# Patient Record
Sex: MALE | Race: WHITE | Employment: FULL TIME | URBAN - METROPOLITAN AREA
[De-identification: names, ages, dates, MRNs, and addresses within clinical notes are randomized per-mention and may not be internally consistent; named-entity substitution may affect disease eponyms.]

---

## 2020-05-20 ENCOUNTER — TELEPHONE (OUTPATIENT)
Dept: INTERNAL MEDICINE CLINIC | Age: 30
End: 2020-05-20

## 2020-05-21 ENCOUNTER — VIRTUAL VISIT (OUTPATIENT)
Dept: INTERNAL MEDICINE CLINIC | Age: 30
End: 2020-05-21
Payer: COMMERCIAL

## 2020-05-21 VITALS — BODY MASS INDEX: 19.25 KG/M2 | HEIGHT: 74 IN | WEIGHT: 150 LBS

## 2020-05-21 PROBLEM — F98.8 ATTENTION DEFICIT DISORDER (ADD) WITHOUT HYPERACTIVITY: Status: ACTIVE | Noted: 2020-05-21

## 2020-05-21 PROCEDURE — 99203 OFFICE O/P NEW LOW 30 MIN: CPT | Performed by: INTERNAL MEDICINE

## 2020-05-21 RX ORDER — METHYLPHENIDATE HYDROCHLORIDE 18 MG/1
18 TABLET ORAL EVERY MORNING
Qty: 30 TABLET | Refills: 0 | Status: SHIPPED
Start: 2020-05-21 | End: 2020-06-18 | Stop reason: DRUGHIGH

## 2020-05-21 RX ORDER — LORATADINE 10 MG/1
10 TABLET ORAL DAILY
COMMUNITY
End: 2021-11-03

## 2020-05-21 ASSESSMENT — PATIENT HEALTH QUESTIONNAIRE - PHQ9
SUM OF ALL RESPONSES TO PHQ QUESTIONS 1-9: 0
2. FEELING DOWN, DEPRESSED OR HOPELESS: 0
1. LITTLE INTEREST OR PLEASURE IN DOING THINGS: 0
SUM OF ALL RESPONSES TO PHQ QUESTIONS 1-9: 0
SUM OF ALL RESPONSES TO PHQ9 QUESTIONS 1 & 2: 0

## 2020-05-21 NOTE — PROGRESS NOTES
Date of Service:  5/21/2020    Chief Complaint:      Chief Complaint   Patient presents with    Establish Care    ADHD       HPI:  Lisha Gillette is a 26 y.o. Patient is being seen Virtually for Video Audio interactive visit due to [place] [facility]. Pt has consented to the Telehealth virtual visit. ADD:  He complains of difficulty concentrating and staying on task at work and at home with home schooling his 5 y/o ADHD son and another younger one who's also very active. He was on Adderall 10 mg bid-tid 2014 and off once he graduated from [facility]. He did have GI upset with the med due to Adderall having lactose and he's lactose intolerant. No results found for: LABA1C, LABMICR  No results found for: NA, K, CL, CO2, BUN, CREATININE, GLUCOSE, CALCIUM  No results found for: CHOL, TRIG, HDL, LDLCALC, LDLDIRECT  No results found for: ALT, AST  No results found for: TSH, T4FREE  No results found for: WBC, HGB, HCT, MCV, PLT  No results found for: INR   No results found for: PSA   No results found for: LABURIC     There is no problem list on file for this patient. No Known Allergies  Outpatient Medications Marked as Taking for the 5/21/20 encounter (Virtual Visit) with Teresa Turner MD   Medication Sig Dispense Refill    loratadine (CLARITIN) 10 MG tablet Take 10 mg by mouth daily           Review of Systems: 14 systems were negative except of what was stated on HPI    Nursing note and vitals reviewed. Vitals:    05/21/20 0838   Weight: 150 lb (68 kg)   Height: 6' 2\" (1.88 m)     Wt Readings from Last 3 Encounters:   05/21/20 150 lb (68 kg)     BP Readings from Last 3 Encounters:   No data found for BP     Body mass index is 19.26 kg/m². Constitutional: Patient appears well-developed and well-nourished. No distress. Head: Normocephalic and atraumatic. Skin: No rash or erythema. Psychiatric: Normal mood and affect.  Behavior is normal.       Assessment/Plan:  Mireya Sommer was seen today for

## 2020-06-18 ENCOUNTER — OFFICE VISIT (OUTPATIENT)
Dept: INTERNAL MEDICINE CLINIC | Age: 30
End: 2020-06-18
Payer: COMMERCIAL

## 2020-06-18 VITALS
TEMPERATURE: 97.9 F | BODY MASS INDEX: 19.12 KG/M2 | DIASTOLIC BLOOD PRESSURE: 80 MMHG | HEART RATE: 67 BPM | HEIGHT: 74 IN | WEIGHT: 149 LBS | SYSTOLIC BLOOD PRESSURE: 124 MMHG | OXYGEN SATURATION: 99 %

## 2020-06-18 PROBLEM — Z79.899 CONTROLLED SUBSTANCE AGREEMENT SIGNED: Status: ACTIVE | Noted: 2020-06-18

## 2020-06-18 LAB
A/G RATIO: 2 (ref 1.1–2.2)
ALBUMIN SERPL-MCNC: 4.9 G/DL (ref 3.4–5)
ALP BLD-CCNC: 60 U/L (ref 40–129)
ALT SERPL-CCNC: 17 U/L (ref 10–40)
ANION GAP SERPL CALCULATED.3IONS-SCNC: 13 MMOL/L (ref 3–16)
AST SERPL-CCNC: 28 U/L (ref 15–37)
BASOPHILS ABSOLUTE: 0 K/UL (ref 0–0.2)
BASOPHILS RELATIVE PERCENT: 0.5 %
BILIRUB SERPL-MCNC: 0.5 MG/DL (ref 0–1)
BUN BLDV-MCNC: 12 MG/DL (ref 7–20)
CALCIUM SERPL-MCNC: 9.5 MG/DL (ref 8.3–10.6)
CHLORIDE BLD-SCNC: 99 MMOL/L (ref 99–110)
CHOLESTEROL, TOTAL: 142 MG/DL (ref 0–199)
CO2: 27 MMOL/L (ref 21–32)
CREAT SERPL-MCNC: 0.9 MG/DL (ref 0.9–1.3)
EOSINOPHILS ABSOLUTE: 0.1 K/UL (ref 0–0.6)
EOSINOPHILS RELATIVE PERCENT: 0.9 %
GFR AFRICAN AMERICAN: >60
GFR NON-AFRICAN AMERICAN: >60
GLOBULIN: 2.4 G/DL
GLUCOSE BLD-MCNC: 92 MG/DL (ref 70–99)
HCT VFR BLD CALC: 43.7 % (ref 40.5–52.5)
HDLC SERPL-MCNC: 60 MG/DL (ref 40–60)
HEMOGLOBIN: 14.4 G/DL (ref 13.5–17.5)
LDL CHOLESTEROL CALCULATED: 69 MG/DL
LYMPHOCYTES ABSOLUTE: 1.7 K/UL (ref 1–5.1)
LYMPHOCYTES RELATIVE PERCENT: 28.7 %
MCH RBC QN AUTO: 31.1 PG (ref 26–34)
MCHC RBC AUTO-ENTMCNC: 33 G/DL (ref 31–36)
MCV RBC AUTO: 94.3 FL (ref 80–100)
MONOCYTES ABSOLUTE: 0.5 K/UL (ref 0–1.3)
MONOCYTES RELATIVE PERCENT: 7.7 %
NEUTROPHILS ABSOLUTE: 3.8 K/UL (ref 1.7–7.7)
NEUTROPHILS RELATIVE PERCENT: 62.2 %
PDW BLD-RTO: 13.5 % (ref 12.4–15.4)
PLATELET # BLD: 256 K/UL (ref 135–450)
PMV BLD AUTO: 8 FL (ref 5–10.5)
POTASSIUM SERPL-SCNC: 4.8 MMOL/L (ref 3.5–5.1)
RBC # BLD: 4.63 M/UL (ref 4.2–5.9)
SODIUM BLD-SCNC: 139 MMOL/L (ref 136–145)
TOTAL PROTEIN: 7.3 G/DL (ref 6.4–8.2)
TRIGL SERPL-MCNC: 66 MG/DL (ref 0–150)
VLDLC SERPL CALC-MCNC: 13 MG/DL
WBC # BLD: 6.1 K/UL (ref 4–11)

## 2020-06-18 PROCEDURE — 36415 COLL VENOUS BLD VENIPUNCTURE: CPT | Performed by: INTERNAL MEDICINE

## 2020-06-18 PROCEDURE — 99395 PREV VISIT EST AGE 18-39: CPT | Performed by: INTERNAL MEDICINE

## 2020-06-18 RX ORDER — METHYLPHENIDATE HYDROCHLORIDE 27 MG/1
27 TABLET ORAL EVERY MORNING
Qty: 30 TABLET | Refills: 0 | Status: SHIPPED
Start: 2020-06-20 | End: 2020-07-16 | Stop reason: DRUGHIGH

## 2020-06-18 NOTE — PROGRESS NOTES
Social History     Socioeconomic History    Marital status:      Spouse name: Not on file    Number of children: Not on file    Years of education: Not on file    Highest education level: Not on file   Occupational History    Not on file   Social Needs    Financial resource strain: Not on file    Food insecurity     Worry: Not on file     Inability: Not on file    Transportation needs     Medical: Not on file     Non-medical: Not on file   Tobacco Use    Smoking status: Never Smoker    Smokeless tobacco: Never Used   Substance and Sexual Activity    Alcohol use: Yes    Drug use: Never    Sexual activity: Yes   Lifestyle    Physical activity     Days per week: Not on file     Minutes per session: Not on file    Stress: Not on file   Relationships    Social connections     Talks on phone: Not on file     Gets together: Not on file     Attends Hindu service: Not on file     Active member of club or organization: Not on file     Attends meetings of clubs or organizations: Not on file     Relationship status: Not on file    Intimate partner violence     Fear of current or ex partner: Not on file     Emotionally abused: Not on file     Physically abused: Not on file     Forced sexual activity: Not on file   Other Topics Concern    Not on file   Social History Narrative    Not on file       Review of Systems:  A comprehensive review of systems was negative except for what was noted in the HPI. Physical Exam:   Vitals:    06/18/20 1121   BP: 124/80   Pulse: 67   Temp: 97.9 °F (36.6 °C)   TempSrc: Infrared   SpO2: 99%   Weight: 149 lb (67.6 kg)   Height: 6' 2\" (1.88 m)     Body mass index is 19.13 kg/m². Constitutional: He is oriented to person, place, and time. He appears well-developed and well-nourished. No distress. HEENT:   Head: Normocephalic and atraumatic.    Right Ear: Tympanic membrane, external ear and ear canal normal.   Left Ear: Tympanic membrane, external ear and ear recommended and ordered   Cholesterol Screen  No results found for: CHOL, TRIG, HDL, LDLCALC, LDLDIRECT Test recommended and ordered       Weight: Body mass index is 19.13 kg/m². 6' 2\" (1.88 m)149 lb (67.6 kg)  Your BMI is between 18.5 and 24.9, which indicates that you are at a healthy weight        Recommended Immunizations      There is no immunization history on file for this patient. Influenza vaccine:  recommended every fall         Other Recommendations ·   See a dentist every 6 months  · Try to get at least 30 minutes of exercise 3-5 days per week  · Always wear a seat belt when traveling in a car  · Always wear a helmet when riding a bicycle or motorcycle  · When exposed to the sun, use a sunscreen that protects against both UVA and UVB radiation with an SPF of 30 or greater- reapply every 2 to 3 hours or after sweating, drying off with a towel, or swimming             Assessment/Plan:  Naga Flynn was seen today for annual exam and adhd. Diagnoses and all orders for this visit:    Annual physical exam  -     Lipid Panel  -     Comprehensive Metabolic Panel  -     CBC Auto Differential    Attention deficit disorder (ADD) without hyperactivity  -     methylphenidate (CONCERTA) 27 MG extended release tablet; Take 1 tablet by mouth every morning for 30 days. Controlled substance agreement signed  -     methylphenidate (CONCERTA) 27 MG extended release tablet; Take 1 tablet by mouth every morning for 30 days. Controlled Substance Monitoring:    Acute and Chronic Pain Monitoring:   RX Monitoring 6/18/2020   Periodic Controlled Substance Monitoring Possible medication side effects, risk of tolerance/dependence & alternative treatments discussed. ;No signs of potential drug abuse or diversion identified. Return VV 1 month on ADD.

## 2020-06-18 NOTE — LETTER
MEDICATION AGREEMENT     Dash Mora  [birth year]      For certain conditions, multiple classes of medications may be used to help better manage your symptoms, and to improve your ability to function at home, work and in social settings. However, these medications do have risks, which will be discussed with you, including addiction and dependency. The following prescribed medications need frequent monitoring and will require you to partner and assist in your healthcare. Medication  Dose, instructions and quantity as indicated on current prescription bottle Diagnosis/Reason(s) for Taking Category     Concerta qd ADD                            Benefits and goals of Controlled Substance Medications: There are two potential goals for your treatment: (1) decreased pain and suffering (2) improved daily life functions. There are many possible treatments for your chronic condition(s), and, in addition to controlled substance medications, we will try alternatives such as physical therapy, yoga, massage, home daily exercise, meditation, relaxation techniques, injections, chiropractic manipulations, surgery, cognitive therapy, hypnosis and many medications that are not habit-forming. Use of controlled substance medications may be helpful, but they are unlikely to resolve all of your symptoms or restore all function. Risks of Controlled Substance Medications:    Opioid pain medications: These medications can lead to problems such as addiction/dependence, sedation, lightheadedness/dizziness, memory issues, falls, constipation, nausea, or vomiting. They may also impair the ability to drive or operate machinery. Additionally, these medications may lower testosterone levels, leading to loss of bone strength, stamina and sex drive.   They may cause problems with breathing, sleep apnea and reduced coughing, which are especially dangerous for patients with lung

## 2020-07-16 ENCOUNTER — VIRTUAL VISIT (OUTPATIENT)
Dept: INTERNAL MEDICINE CLINIC | Age: 30
End: 2020-07-16
Payer: COMMERCIAL

## 2020-07-16 PROCEDURE — 99213 OFFICE O/P EST LOW 20 MIN: CPT | Performed by: INTERNAL MEDICINE

## 2020-07-16 RX ORDER — METHYLPHENIDATE HYDROCHLORIDE 10 MG/1
10 TABLET ORAL 3 TIMES DAILY
Qty: 30 TABLET | Refills: 0 | Status: SHIPPED | OUTPATIENT
Start: 2020-07-16 | End: 2020-07-23 | Stop reason: SDUPTHER

## 2020-07-16 NOTE — PROGRESS NOTES
Date of Service:  7/16/2020    Chief Complaint:      Chief Complaint   Patient presents with    ADHD       HPI:  Anton Cortes is a 31 y.o. Pursuant to the emergency declaration under the [facility], [facility] waiver authority and the [employer] and [employer] Act, this Virtual  Video Visit was conducted, with patient's consent, to reduce the patient's risk of exposure to COVID-19 and provide continuity of care. Service is  provided through a video synchronous discussion virtually to substitute for in-person clinic visit with the patient being at home and Dr. Leticia Frye being at home. ADD:  improvement in concentrating and staying on task at work and at home with home schooling his 7 y/o ADHD son and another younger one who's also very active on Concerta 27 mg qd. He wanted to change to methylphenidate 10 mg tid since it only cost $13.  He used to be on Adderall 10 mg bid-tid 2014 and off once he graduated from [address] [place] [address] Way did have GI upset with the med due to Adderall having lactose and he's lactose intolerant.     No results found for: Frances Shed  Lab Results   Component Value Date     06/18/2020    K 4.8 06/18/2020    CL 99 06/18/2020    CO2 27 06/18/2020    BUN 12 06/18/2020    CREATININE 0.9 06/18/2020    GLUCOSE 92 06/18/2020    CALCIUM 9.5 06/18/2020     Lab Results   Component Value Date    CHOL 142 06/18/2020    TRIG 66 06/18/2020    HDL 60 06/18/2020    LDLCALC 69 06/18/2020     Lab Results   Component Value Date    ALT 17 06/18/2020    AST 28 06/18/2020     No results found for: TSH, T4FREE  Lab Results   Component Value Date    WBC 6.1 06/18/2020    HGB 14.4 06/18/2020    HCT 43.7 06/18/2020    MCV 94.3 06/18/2020     06/18/2020     No results found for: INR   No results found for: PSA   No results found for: [facility]     Patient Active Problem List   Diagnosis    Attention deficit disorder (ADD) without hyperactivity    Controlled substance agreement signed       No Known Allergies  Outpatient Medications Marked as Taking for the 7/16/20 encounter (Virtual Visit) with Morelia Turner MD   Medication Sig Dispense Refill    methylphenidate (CONCERTA) 27 MG extended release tablet Take 1 tablet by mouth every morning for 30 days. 30 tablet 0    loratadine (CLARITIN) 10 MG tablet Take 10 mg by mouth daily           Review of Systems: 14 systems were negative except of what was stated on HPI    Nursing note and vitals reviewed. There were no vitals filed for this visit. Wt Readings from Last 3 Encounters:   06/18/20 149 lb (67.6 kg)   05/21/20 150 lb (68 kg)     BP Readings from Last 3 Encounters:   06/18/20 124/80     There is no height or weight on file to calculate BMI. Constitutional: Patient appears well-developed and well-nourished. No distress. Head: Normocephalic and atraumatic. Skin: No rash or erythema. Psychiatric: Normal mood and affect. Behavior is normal.       Assessment/Plan:  Venus Diaz was seen today for adhd. Diagnoses and all orders for this visit:    Attention deficit disorder (ADD) without hyperactivity  Change to methylphenidate (RITALIN) 10 MG tablet; Take 1 tablet by mouth 3 times daily for 90 days. Controlled substance agreement signed  Change to methylphenidate (RITALIN) 10 MG tablet; Take 1 tablet by mouth 3 times daily for 90 days. If works well, send a message and will send 2 more months    Return Oct 22 at 1:20 for ADD.

## 2020-07-23 RX ORDER — METHYLPHENIDATE HYDROCHLORIDE 10 MG/1
10 TABLET ORAL 3 TIMES DAILY PRN
Qty: 90 TABLET | Refills: 0 | Status: SHIPPED | OUTPATIENT
Start: 2020-08-26 | End: 2020-10-22 | Stop reason: SDUPTHER

## 2020-07-23 RX ORDER — METHYLPHENIDATE HYDROCHLORIDE 10 MG/1
10 TABLET ORAL 3 TIMES DAILY PRN
Qty: 90 TABLET | Refills: 0 | Status: SHIPPED | OUTPATIENT
Start: 2020-09-26 | End: 2020-10-22 | Stop reason: SDUPTHER

## 2020-07-23 RX ORDER — METHYLPHENIDATE HYDROCHLORIDE 10 MG/1
10 TABLET ORAL 3 TIMES DAILY PRN
Qty: 90 TABLET | Refills: 0 | Status: SHIPPED | OUTPATIENT
Start: 2020-07-26 | End: 2020-10-22 | Stop reason: SDUPTHER

## 2020-10-22 ENCOUNTER — TELEMEDICINE (OUTPATIENT)
Dept: INTERNAL MEDICINE CLINIC | Age: 30
End: 2020-10-22
Payer: COMMERCIAL

## 2020-10-22 PROCEDURE — 99213 OFFICE O/P EST LOW 20 MIN: CPT | Performed by: INTERNAL MEDICINE

## 2020-10-22 RX ORDER — METHYLPHENIDATE HYDROCHLORIDE 10 MG/1
10 TABLET ORAL 3 TIMES DAILY PRN
Qty: 90 TABLET | Refills: 0 | Status: SHIPPED | OUTPATIENT
Start: 2020-12-28 | End: 2021-01-26 | Stop reason: SDUPTHER

## 2020-10-22 RX ORDER — METHYLPHENIDATE HYDROCHLORIDE 10 MG/1
10 TABLET ORAL 3 TIMES DAILY
Qty: 90 TABLET | Refills: 0 | Status: SHIPPED | OUTPATIENT
Start: 2020-10-22 | End: 2021-01-26 | Stop reason: SDUPTHER

## 2020-10-22 RX ORDER — METHYLPHENIDATE HYDROCHLORIDE 10 MG/1
10 TABLET ORAL 3 TIMES DAILY
Qty: 90 TABLET | Refills: 0 | Status: SHIPPED | OUTPATIENT
Start: 2020-11-28 | End: 2021-01-26 | Stop reason: SDUPTHER

## 2020-10-22 NOTE — PROGRESS NOTES
Date of Service:  10/22/2020    Chief Complaint:      Chief Complaint   Patient presents with    ADHD       HPI:  Sheri Olsen is a 31 y.o. Pursuant to the emergency declaration under the [facility], [facility] waiver authority and the [employer] and [employer] Act, this Virtual  Video Visit was conducted, with patient's consent, to reduce the patient's risk of exposure to COVID-19 and provide continuity of care. Service is  provided through a video synchronous discussion virtually to substitute for in-person clinic visit with the patient being at home and Dr. Ha Adorno being at home.     ADD:  improvement in concentrating and staying on task at work and at home with home schooling his 5 y/o ADHD son and another younger one who's also very active on Ritalin 10 mg 2 AM and 1 afternoon.  He used to be on Adderall 10 mg bid-tid 2014 and off once he graduated from [address] did have GI upset with the med due to Adderall having lactose and he's lactose intolerant    No results found for: LABA1C, LABMICR  Lab Results   Component Value Date     06/18/2020    K 4.8 06/18/2020    CL 99 06/18/2020    CO2 27 06/18/2020    BUN 12 06/18/2020    CREATININE 0.9 06/18/2020    GLUCOSE 92 06/18/2020    CALCIUM 9.5 06/18/2020     Lab Results   Component Value Date    CHOL 142 06/18/2020    TRIG 66 06/18/2020    HDL 60 06/18/2020    LDLCALC 69 06/18/2020     Lab Results   Component Value Date    ALT 17 06/18/2020    AST 28 06/18/2020     No results found for: TSH, T4FREE  Lab Results   Component Value Date    WBC 6.1 06/18/2020    HGB 14.4 06/18/2020    HCT 43.7 06/18/2020    MCV 94.3 06/18/2020     06/18/2020     No results found for: INR   No results found for: PSA   No results found for: [facility]     Patient Active Problem List   Diagnosis    Attention deficit disorder (ADD) without hyperactivity    Controlled substance agreement signed No Known Allergies  Outpatient Medications Marked as Taking for the 10/22/20 encounter (Telemedicine) with Tahir Turner MD   Medication Sig Dispense Refill    methylphenidate (RITALIN) 10 MG tablet Take 1 tablet by mouth 3 times daily as needed (add) for up to 30 days. 90 tablet 0    loratadine (CLARITIN) 10 MG tablet Take 10 mg by mouth daily           Review of Systems: 14 systems were negative except of what was stated on HPI    Nursing note and vitals reviewed. There were no vitals filed for this visit. Wt Readings from Last 3 Encounters:   06/18/20 149 lb (67.6 kg)   05/21/20 150 lb (68 kg)     BP Readings from Last 3 Encounters:   06/18/20 124/80     There is no height or weight on file to calculate BMI. Constitutional: Patient appears well-developed and well-nourished. No distress. Head: Normocephalic and atraumatic. Skin: No rash or erythema. Psychiatric: Normal mood and affect. Behavior is normal.       Assessment/Plan:  Renee Landis was seen today for adhd. Diagnoses and all orders for this visit:    Attention deficit disorder (ADD) without hyperactivity  -     methylphenidate (RITALIN) 10 MG tablet; Take 1 tablet by mouth 3 times daily as needed (add) for up to 30 days. -     methylphenidate (RITALIN) 10 MG tablet; Take 1 tablet by mouth 3 times daily for 30 days. -     methylphenidate (RITALIN) 10 MG tablet; Take 1 tablet by mouth 3 times daily for 30 days. 2 po AM and 1 PM    Controlled substance agreement signed  -     methylphenidate (RITALIN) 10 MG tablet; Take 1 tablet by mouth 3 times daily as needed (add) for up to 30 days. -     methylphenidate (RITALIN) 10 MG tablet; Take 1 tablet by mouth 3 times daily for 30 days. -     methylphenidate (RITALIN) 10 MG tablet; Take 1 tablet by mouth 3 times daily for 30 days.  2 po AM and 1 PM      Controlled Substance Monitoring:    Acute and Chronic Pain Monitoring:   RX Monitoring 10/22/2020   Periodic Controlled Substance Monitoring Possible medication side effects, risk of tolerance/dependence & alternative treatments discussed. ;No signs of potential drug abuse or diversion identified. Return Jan 26 VV 1 ADD.

## 2021-01-26 ENCOUNTER — VIRTUAL VISIT (OUTPATIENT)
Dept: INTERNAL MEDICINE CLINIC | Age: 31
End: 2021-01-26
Payer: COMMERCIAL

## 2021-01-26 DIAGNOSIS — Z79.899 CONTROLLED SUBSTANCE AGREEMENT SIGNED: ICD-10-CM

## 2021-01-26 DIAGNOSIS — F98.8 ATTENTION DEFICIT DISORDER (ADD) WITHOUT HYPERACTIVITY: ICD-10-CM

## 2021-01-26 PROCEDURE — 99213 OFFICE O/P EST LOW 20 MIN: CPT | Performed by: INTERNAL MEDICINE

## 2021-01-26 RX ORDER — METHYLPHENIDATE HYDROCHLORIDE 10 MG/1
10 TABLET ORAL 3 TIMES DAILY PRN
Qty: 90 TABLET | Refills: 0 | Status: SHIPPED | OUTPATIENT
Start: 2021-03-28 | End: 2021-04-27 | Stop reason: SDUPTHER

## 2021-01-26 RX ORDER — METHYLPHENIDATE HYDROCHLORIDE 10 MG/1
20 TABLET ORAL 2 TIMES DAILY
Qty: 120 TABLET | Refills: 0 | Status: SHIPPED | OUTPATIENT
Start: 2021-01-28 | End: 2021-04-27 | Stop reason: SDUPTHER

## 2021-01-26 RX ORDER — METHYLPHENIDATE HYDROCHLORIDE 10 MG/1
20 TABLET ORAL 2 TIMES DAILY
Qty: 120 TABLET | Refills: 0 | Status: SHIPPED | OUTPATIENT
Start: 2021-01-26 | End: 2021-04-27 | Stop reason: SDUPTHER

## 2021-01-26 NOTE — PROGRESS NOTES
Date of Service:  1/26/2021    Chief Complaint:      Chief Complaint   Patient presents with    ADHD       HPI:  Michael Perez is a 31 y.o. Pursuant to the emergency declaration under the [facility], [facility] waiver authority and the [employer] and [employer] Act, this Virtual  Video Visit was conducted, with patient's consent, to reduce the patient's risk of exposure to COVID-19 and provide continuity of care. Service is  provided through a video synchronous discussion virtually to substitute for in-person clinic visit with the patient being at home and Dr. Alfredito Puga being at home.     ADD:  improvement in concentrating and staying on task at work and at home with home schooling his 7 y/o ADHD son and another younger one who's also very active on Ritalin 10 mg 2 at 8 AM and not work as well around 11ish and 1 afternoon.  He used to be on Adderall 10 mg bid-tid 2014 and off once he graduated from [address] did have GI upset with the med due to Adderall having lactose and he's lactose intolerant    No results found for: LABA1C, LABMICR  Lab Results   Component Value Date     06/18/2020    K 4.8 06/18/2020    CL 99 06/18/2020    CO2 27 06/18/2020    BUN 12 06/18/2020    CREATININE 0.9 06/18/2020    GLUCOSE 92 06/18/2020    CALCIUM 9.5 06/18/2020     Lab Results   Component Value Date    CHOL 142 06/18/2020    TRIG 66 06/18/2020    HDL 60 06/18/2020    LDLCALC 69 06/18/2020     Lab Results   Component Value Date    ALT 17 06/18/2020    AST 28 06/18/2020     No results found for: TSH, T4FREE  Lab Results   Component Value Date    WBC 6.1 06/18/2020    HGB 14.4 06/18/2020    HCT 43.7 06/18/2020    MCV 94.3 06/18/2020     06/18/2020     No results found for: INR   No results found for: PSA   No results found for: [facility]     Patient Active Problem List   Diagnosis    Attention deficit disorder (ADD) without hyperactivity  Controlled substance agreement signed       No Known Allergies  Outpatient Medications Marked as Taking for the 1/26/21 encounter (Virtual Visit) with Francie Prior Yue MD   Medication Sig Dispense Refill    methylphenidate (RITALIN) 10 MG tablet Take 1 tablet by mouth 3 times daily as needed (add) for up to 30 days. 90 tablet 0    loratadine (CLARITIN) 10 MG tablet Take 10 mg by mouth daily           Review of Systems: 14 systems were negative except of what was stated on HPI    Nursing note and vitals reviewed. There were no vitals filed for this visit. Wt Readings from Last 3 Encounters:   06/18/20 149 lb (67.6 kg)   05/21/20 150 lb (68 kg)     BP Readings from Last 3 Encounters:   06/18/20 124/80     There is no height or weight on file to calculate BMI. Constitutional: Patient appears well-developed and well-nourished. No distress. Head: Normocephalic and atraumatic. Skin: No rash or erythema. Psychiatric: Normal mood and affect. Behavior is normal.       Assessment/Plan:  Josh Cottrell was seen today for adhd. Diagnoses and all orders for this visit:    Attention deficit disorder (ADD) without hyperactivity  -     methylphenidate (RITALIN) 10 MG tablet; Take 1 tablet by mouth 3 times daily as needed (add) for up to 30 days. -     methylphenidate (RITALIN) 10 MG tablet; Take 2 tablets by mouth 2 times daily for 30 days. -     methylphenidate (RITALIN) 10 MG tablet; Take 2 tablets by mouth 2 times daily for 30 days. 2 po AM and 1 PM    Controlled substance agreement signed  -     methylphenidate (RITALIN) 10 MG tablet; Take 1 tablet by mouth 3 times daily as needed (add) for up to 30 days. -     methylphenidate (RITALIN) 10 MG tablet; Take 2 tablets by mouth 2 times daily for 30 days. -     methylphenidate (RITALIN) 10 MG tablet; Take 2 tablets by mouth 2 times daily for 30 days.  2 po AM and 1 PM    Increase dose to above    Controlled Substance Monitoring:    Acute and Chronic Pain Monitoring: RX Monitoring 1/26/2021   Periodic Controlled Substance Monitoring Possible medication side effects, risk of tolerance/dependence & alternative treatments discussed. ;No signs of potential drug abuse or diversion identified. Return April 27 at 1 VV ADD f/u.

## 2021-04-27 ENCOUNTER — VIRTUAL VISIT (OUTPATIENT)
Dept: INTERNAL MEDICINE CLINIC | Age: 31
End: 2021-04-27
Payer: COMMERCIAL

## 2021-04-27 DIAGNOSIS — F98.8 ATTENTION DEFICIT DISORDER (ADD) WITHOUT HYPERACTIVITY: ICD-10-CM

## 2021-04-27 DIAGNOSIS — Z79.899 CONTROLLED SUBSTANCE AGREEMENT SIGNED: ICD-10-CM

## 2021-04-27 PROCEDURE — 99213 OFFICE O/P EST LOW 20 MIN: CPT | Performed by: INTERNAL MEDICINE

## 2021-04-27 RX ORDER — METHYLPHENIDATE HYDROCHLORIDE 10 MG/1
20 TABLET ORAL 2 TIMES DAILY
Qty: 120 TABLET | Refills: 0 | Status: SHIPPED | OUTPATIENT
Start: 2021-04-27 | End: 2021-07-28 | Stop reason: SDUPTHER

## 2021-04-27 RX ORDER — METHYLPHENIDATE HYDROCHLORIDE 10 MG/1
20 TABLET ORAL 2 TIMES DAILY
Qty: 120 TABLET | Refills: 0 | Status: SHIPPED | OUTPATIENT
Start: 2021-05-28 | End: 2021-07-28 | Stop reason: SDUPTHER

## 2021-04-27 NOTE — PROGRESS NOTES
Date of Service:  4/27/2021    Chief Complaint:      Chief Complaint   Patient presents with    ADHD       HPI:  Toro Rascon is a 31 y.o. Pursuant to the emergency declaration under the [facility] waiver authority and the [employer] and [employer] Act, this Virtual  Video Visit was conducted, with patient's consent, to reduce the patient's risk of exposure to COVID-19 and provide continuity of care. Service is  provided through a video synchronous discussion virtually to substitute for in-person clinic visit with the patient being at home and Dr. Turner Wilson being at home. Patient consent to the video visit.     ADD:  improvement in concentrating and staying on task at work and at home with home schooling his 5 y/o ADHD son and another younger one who's also very active on Ritalin 10 mg 2 AM, 2 PM (1 at atime spaced out in the) 6 days a week.  He used to be on Adderall 10 mg bid-tid 2014 and off once he graduated from [address] [place] [address] Way did have GI upset with the med due to Adderall having lactose and he's lactose intolerant    No results found for: LABA1C, LABMICR  Lab Results   Component Value Date     06/18/2020    K 4.8 06/18/2020    CL 99 06/18/2020    CO2 27 06/18/2020    BUN 12 06/18/2020    CREATININE 0.9 06/18/2020    GLUCOSE 92 06/18/2020    CALCIUM 9.5 06/18/2020     Lab Results   Component Value Date    CHOL 142 06/18/2020    TRIG 66 06/18/2020    HDL 60 06/18/2020    LDLCALC 69 06/18/2020     Lab Results   Component Value Date    ALT 17 06/18/2020    AST 28 06/18/2020     No results found for: TSH, T4FREE  Lab Results   Component Value Date    WBC 6.1 06/18/2020    HGB 14.4 06/18/2020    HCT 43.7 06/18/2020    MCV 94.3 06/18/2020     06/18/2020     No results found for: INR   No results found for: PSA   No results found for: [facility]     Patient Active Problem List   Diagnosis    Attention deficit disorder (ADD) without hyperactivity    Controlled substance agreement signed       No Known Allergies  Outpatient Medications Marked as Taking for the 4/27/21 encounter (Virtual Visit) with Meaghan Turner MD   Medication Sig Dispense Refill    methylphenidate (RITALIN) 10 MG tablet Take 1 tablet by mouth 3 times daily as needed (add) for up to 30 days. 90 tablet 0    loratadine (CLARITIN) 10 MG tablet Take 10 mg by mouth daily           Review of Systems: 14 systems were negative except of what was stated on HPI    Nursing note and vitals reviewed. There were no vitals filed for this visit. Wt Readings from Last 3 Encounters:   06/18/20 149 lb (67.6 kg)   05/21/20 150 lb (68 kg)     BP Readings from Last 3 Encounters:   06/18/20 124/80     There is no height or weight on file to calculate BMI. Constitutional: Patient appears well-developed and well-nourished. No distress. Head: Normocephalic and atraumatic. Skin: No rash or erythema. Psychiatric: Normal mood and affect. Behavior is normal.       Assessment/Plan:  Beverley Pace was seen today for adhd. Diagnoses and all orders for this visit:    Attention deficit disorder (ADD) without hyperactivity  -     methylphenidate (RITALIN) 10 MG tablet; Take 2 tablets by mouth 2 times daily for 30 days. -     methylphenidate (RITALIN) 10 MG tablet; Take 2 tablets by mouth 2 times daily for 30 days. -     methylphenidate (RITALIN) 10 MG tablet; Take 2 tablets by mouth 2 times daily for 30 days. Controlled substance agreement signed  -     methylphenidate (RITALIN) 10 MG tablet; Take 2 tablets by mouth 2 times daily for 30 days. -     methylphenidate (RITALIN) 10 MG tablet; Take 2 tablets by mouth 2 times daily for 30 days. -     methylphenidate (RITALIN) 10 MG tablet; Take 2 tablets by mouth 2 times daily for 30 days.       Controlled Substance Monitoring:    Acute and Chronic Pain Monitoring:   RX Monitoring 1/26/2021   Periodic Controlled Substance Monitoring Possible medication side effects, risk of tolerance/dependence & alternative treatments discussed. ;No signs of potential drug abuse or diversion identified.            Return July 28 at 8:40 Fasting Physical.

## 2021-07-28 ENCOUNTER — OFFICE VISIT (OUTPATIENT)
Dept: INTERNAL MEDICINE CLINIC | Age: 31
End: 2021-07-28
Payer: COMMERCIAL

## 2021-07-28 VITALS
DIASTOLIC BLOOD PRESSURE: 68 MMHG | HEART RATE: 65 BPM | OXYGEN SATURATION: 98 % | HEIGHT: 74 IN | SYSTOLIC BLOOD PRESSURE: 118 MMHG | WEIGHT: 153 LBS | BODY MASS INDEX: 19.64 KG/M2

## 2021-07-28 DIAGNOSIS — Z79.899 CONTROLLED SUBSTANCE AGREEMENT SIGNED: ICD-10-CM

## 2021-07-28 DIAGNOSIS — F98.8 ATTENTION DEFICIT DISORDER (ADD) WITHOUT HYPERACTIVITY: ICD-10-CM

## 2021-07-28 DIAGNOSIS — Z00.00 ANNUAL PHYSICAL EXAM: Primary | ICD-10-CM

## 2021-07-28 PROCEDURE — 99395 PREV VISIT EST AGE 18-39: CPT | Performed by: INTERNAL MEDICINE

## 2021-07-28 RX ORDER — METHYLPHENIDATE HYDROCHLORIDE 10 MG/1
20 TABLET ORAL 2 TIMES DAILY
Qty: 120 TABLET | Refills: 0 | Status: SHIPPED | OUTPATIENT
Start: 2021-09-28 | End: 2021-11-03 | Stop reason: SDUPTHER

## 2021-07-28 RX ORDER — METHYLPHENIDATE HYDROCHLORIDE 10 MG/1
20 TABLET ORAL 2 TIMES DAILY
Qty: 120 TABLET | Refills: 0 | Status: SHIPPED | OUTPATIENT
Start: 2021-07-28 | End: 2021-11-03 | Stop reason: SDUPTHER

## 2021-07-28 RX ORDER — CETIRIZINE HYDROCHLORIDE 10 MG/1
10 TABLET ORAL DAILY
COMMUNITY

## 2021-07-28 RX ORDER — METHYLPHENIDATE HYDROCHLORIDE 10 MG/1
20 TABLET ORAL 2 TIMES DAILY
Qty: 120 TABLET | Refills: 0 | Status: SHIPPED | OUTPATIENT
Start: 2021-08-28 | End: 2021-11-03 | Stop reason: SDUPTHER

## 2021-07-28 SDOH — ECONOMIC STABILITY: FOOD INSECURITY: WITHIN THE PAST 12 MONTHS, THE FOOD YOU BOUGHT JUST DIDN'T LAST AND YOU DIDN'T HAVE MONEY TO GET MORE.: NEVER TRUE

## 2021-07-28 SDOH — ECONOMIC STABILITY: FOOD INSECURITY: WITHIN THE PAST 12 MONTHS, YOU WORRIED THAT YOUR FOOD WOULD RUN OUT BEFORE YOU GOT MONEY TO BUY MORE.: NEVER TRUE

## 2021-07-28 ASSESSMENT — PATIENT HEALTH QUESTIONNAIRE - PHQ9
SUM OF ALL RESPONSES TO PHQ9 QUESTIONS 1 & 2: 0
SUM OF ALL RESPONSES TO PHQ QUESTIONS 1-9: 0
1. LITTLE INTEREST OR PLEASURE IN DOING THINGS: 0
SUM OF ALL RESPONSES TO PHQ QUESTIONS 1-9: 0
2. FEELING DOWN, DEPRESSED OR HOPELESS: 0
SUM OF ALL RESPONSES TO PHQ QUESTIONS 1-9: 0

## 2021-07-28 ASSESSMENT — SOCIAL DETERMINANTS OF HEALTH (SDOH): HOW HARD IS IT FOR YOU TO PAY FOR THE VERY BASICS LIKE FOOD, HOUSING, MEDICAL CARE, AND HEATING?: NOT HARD AT ALL

## 2021-07-28 NOTE — PROGRESS NOTES
[facility]  History and Physical  Paty Dang M.D. Robin Mei  YOB: 1990    Date of Service:  7/28/2021    Chief Complaint:   Robin Mei is a 27 y.o. male who presents for   Chief Complaint   Patient presents with    Annual Exam       Assessment/Plan:    Kodak Rodríguez was seen today for annual exam.    Diagnoses and all orders for this visit:    Annual physical exam  -     CBC Auto Differential; Future  -     Lipid Panel; Future  -     Comprehensive Metabolic Panel; Future    Attention deficit disorder (ADD) without hyperactivity  -     methylphenidate (RITALIN) 10 MG tablet; Take 2 tablets by mouth 2 times daily for 30 days. -     methylphenidate (RITALIN) 10 MG tablet; Take 2 tablets by mouth 2 times daily for 30 days. -     methylphenidate (RITALIN) 10 MG tablet; Take 2 tablets by mouth 2 times daily for 30 days. Controlled substance agreement signed  -     methylphenidate (RITALIN) 10 MG tablet; Take 2 tablets by mouth 2 times daily for 30 days. -     methylphenidate (RITALIN) 10 MG tablet; Take 2 tablets by mouth 2 times daily for 30 days. -     methylphenidate (RITALIN) 10 MG tablet; Take 2 tablets by mouth 2 times daily for 30 days. Controlled Substance Monitoring:    Acute and Chronic Pain Monitoring:   RX Monitoring 7/28/2021   Periodic Controlled Substance Monitoring Possible medication side effects, risk of tolerance/dependence & alternative treatments discussed. ;No signs of potential drug abuse or diversion identified. Return 3 months ADD. HPI: Here for Annual Physical and Follow up.     ADD:  improvement in concentrating and staying on task at work and at home with home schooling his 5 y/o ADHD son and another younger one who's also very active on Ritalin 10 mg 2 AM, 2 PM (1 at a time spaced out in the) 6 days a week.  He used to be on Adderall 10 mg bid-tid 2014 and off once he graduated from [address] [place] [address] Way did have GI upset with the med due to Adderall having lactose and he's lactose intolerant    No results found for: LABA1C, LABMICR  Lab Results   Component Value Date     06/18/2020    K 4.8 06/18/2020    CL 99 06/18/2020    CO2 27 06/18/2020    BUN 12 06/18/2020    CREATININE 0.9 06/18/2020    GLUCOSE 92 06/18/2020    CALCIUM 9.5 06/18/2020     Lab Results   Component Value Date    CHOL 142 06/18/2020    TRIG 66 06/18/2020    HDL 60 06/18/2020    LDLCALC 69 06/18/2020     Lab Results   Component Value Date    ALT 17 06/18/2020    AST 28 06/18/2020     No results found for: TSH, T4FREE  Lab Results   Component Value Date    WBC 6.1 06/18/2020    HGB 14.4 06/18/2020    HCT 43.7 06/18/2020    MCV 94.3 06/18/2020     06/18/2020     No results found for: INR   No results found for: PSA   No results found for: LABURIC     Wt Readings from Last 3 Encounters:   07/28/21 153 lb (69.4 kg)   06/18/20 149 lb (67.6 kg)   05/21/20 150 lb (68 kg)     BP Readings from Last 3 Encounters:   07/28/21 118/68   06/18/20 124/80       Patient Active Problem List   Diagnosis    Attention deficit disorder (ADD) without hyperactivity    Controlled substance agreement signed       No Known Allergies  Outpatient Medications Marked as Taking for the 7/28/21 encounter (Office Visit) with Taya Turner MD   Medication Sig Dispense Refill    cetirizine (ZYRTEC) 10 MG tablet Take 10 mg by mouth daily         Past Medical History:   Diagnosis Date    ADHD (attention deficit hyperactivity disorder)      History reviewed. No pertinent surgical history.   Family History   Problem Relation Age of Onset    Atrial Fibrillation Mother     Stroke Father     Heart Disease Paternal Grandfather     Stroke Paternal Grandfather     Heart Disease Maternal Grandfather      Social History     Socioeconomic History    Marital status:      Spouse name: Not on file    Number of children: Not on file    Years of education: Not on file    Highest education level: Not on file   Occupational History    Not on file   Tobacco Use    Smoking status: Never Smoker    Smokeless tobacco: Never Used   Vaping Use    Vaping Use: Never used   Substance and Sexual Activity    Alcohol use: Yes     Comment: socially    Drug use: Never    Sexual activity: Yes   Other Topics Concern    Not on file   Social History Narrative    Not on file     Social Determinants of Health     Financial Resource Strain: Low Risk     Difficulty of Paying Living Expenses: Not hard at all   Food Insecurity: No Food Insecurity    Worried About [address] in the Last Year: Never true    [facility] in the Last Year: Never true   Transportation Needs:     Lack of Transportation (Medical):  Lack of Transportation (Non-Medical):    Physical Activity:     Days of Exercise per Week:     Minutes of Exercise per Session:    Stress:     Feeling of Stress :    Social Connections:     Frequency of Communication with Friends and Family:     Frequency of Social Gatherings with Friends and Family:     Attends Anab[employer]ist Services:     Active Member of Clubs or Organizations:     Attends Club or Organization Meetings:     Marital Status:    Intimate Partner Violence:     Fear of Current or Ex-Partner:     Emotionally Abused:     Physically Abused:     Sexually Abused:        Review of Systems:  A comprehensive review of systems was negative except for what was noted in the HPI. Physical Exam:   Vitals:    07/28/21 0845   BP: 118/68   Pulse: 65   SpO2: 98%   Weight: 153 lb (69.4 kg)   Height: 6' 2\" (1.88 m)     Body mass index is 19.64 kg/m². Constitutional: He is oriented to person, place, and time. He appears well-developed and well-nourished. No distress. HEENT:   Head: Normocephalic and atraumatic.    Right Ear: Tympanic membrane, external ear and ear canal normal.   Left Ear: Tympanic membrane, external ear and ear canal normal.   Mouth/Throat: Oropharynx is clear and moist and mucous membranes are normal. No oropharyngeal exudate or posterior oropharyngeal erythema. He has no cervical adenopathy. Eyes: Conjunctivae and extraocular motions are normal. Pupils are equal, round, and reactive to light. Neck:  Supple. No JVD present. Carotid bruit is not present. No mass and no thyromegaly present. Cardiovascular: Normal rate, regular rhythm, normal heart sounds and intact distal pulses. Exam reveals no gallop and no friction rub. No murmur heard. Pulmonary/Chest: Effort normal and breath sounds normal. No respiratory distress. He has no wheezes, rhonchi or rales. Abdominal: Soft, non-tender. Bowel sounds and aorta are normal. There is no organomegaly, mass or bruit. Musculoskeletal: Normal range of motion, no synovitis. He exhibits no edema. Neurological: He is alert and oriented to person, place, and time. He has normal reflexes. No cranial nerve deficit. Coordination normal.   Skin: Skin is warm, dry and intact. No suspicious lesions are noted. Psychiatric: He has a normal mood and affect.  His speech is normal and behavior is normal. Judgment, cognition and memory are normal.     Preventive Care:  Health Maintenance   Topic Date Due    Hepatitis C screen  Never done    Hepatitis A vaccine (2 of 2 - 2-dose series) 01/25/2007    Flu vaccine (1) 09/01/2021    DTaP/Tdap/Td vaccine (4 - Td or Tdap) 09/24/2028    COVID-19 Vaccine  Completed    Hepatitis B vaccine  Aged Out    Hib vaccine  Aged Out    Meningococcal (ACWY) vaccine  Aged Out    Pneumococcal 0-64 years Vaccine  Aged Out    Varicella vaccine  Discontinued    HIV screen  Discontinued        Sexual activity: has sex with females   Last eye exam: 11/2020, normal  Exercise: walks 7 time(s) per week         Preventive plan of care for Sheri Olsen        7/28/2021           Preventive Measures Status       Recommendations for screening           Diabetes Screen  Glucose (mg/dL)   Date Value   06/18/2020 92 Test recommended and ordered   Cholesterol Screen  Lab Results   Component Value Date    CHOL 142 06/18/2020    TRIG 66 06/18/2020    HDL 60 06/18/2020    LDLCALC 69 06/18/2020    Test recommended and ordered       Weight: Body mass index is 19.64 kg/m².   6' 2\" (1.88 m)153 lb (69.4 kg)  Your BMI is between 18.5 and 24.9, which indicates that you are at a healthy weight        Recommended Immunizations    Immunization History   Administered Date(s) Administered    Tdap (Boostrix, Adacel) 01/01/2018    Zoster Recombinant (Shingrix) 01/24/2020    Influenza vaccine:  recommended every fall         Other Recommendations ·   See a dentist every 6 months  · Try to get at least 30 minutes of exercise 3-5 days per week  · Always wear a seat belt when traveling in a car  · Always wear a helmet when riding a bicycle or motorcycle  · When exposed to the sun, use a sunscreen that protects against both UVA and UVB radiation with an SPF of 30 or greater- reapply every 2 to 3 hours or after sweating, drying off with a towel, or swimming

## 2021-11-03 ENCOUNTER — OFFICE VISIT (OUTPATIENT)
Dept: INTERNAL MEDICINE CLINIC | Age: 31
End: 2021-11-03
Payer: COMMERCIAL

## 2021-11-03 VITALS
DIASTOLIC BLOOD PRESSURE: 70 MMHG | HEART RATE: 60 BPM | SYSTOLIC BLOOD PRESSURE: 124 MMHG | OXYGEN SATURATION: 100 % | HEIGHT: 74 IN | BODY MASS INDEX: 19.51 KG/M2 | WEIGHT: 152 LBS

## 2021-11-03 DIAGNOSIS — Z79.899 CONTROLLED SUBSTANCE AGREEMENT SIGNED: ICD-10-CM

## 2021-11-03 DIAGNOSIS — Z00.00 ANNUAL PHYSICAL EXAM: ICD-10-CM

## 2021-11-03 DIAGNOSIS — Z23 NEED FOR INFLUENZA VACCINATION: ICD-10-CM

## 2021-11-03 DIAGNOSIS — F98.8 ATTENTION DEFICIT DISORDER (ADD) WITHOUT HYPERACTIVITY: ICD-10-CM

## 2021-11-03 DIAGNOSIS — F43.22 ADJUSTMENT DISORDER WITH ANXIETY: Primary | ICD-10-CM

## 2021-11-03 LAB
A/G RATIO: 2.1 (ref 1.1–2.2)
ALBUMIN SERPL-MCNC: 5 G/DL (ref 3.4–5)
ALP BLD-CCNC: 59 U/L (ref 40–129)
ALT SERPL-CCNC: 16 U/L (ref 10–40)
ANION GAP SERPL CALCULATED.3IONS-SCNC: 12 MMOL/L (ref 3–16)
AST SERPL-CCNC: 25 U/L (ref 15–37)
BASOPHILS ABSOLUTE: 0 K/UL (ref 0–0.2)
BASOPHILS RELATIVE PERCENT: 0.5 %
BILIRUB SERPL-MCNC: 0.4 MG/DL (ref 0–1)
BUN BLDV-MCNC: 11 MG/DL (ref 7–20)
CALCIUM SERPL-MCNC: 9.6 MG/DL (ref 8.3–10.6)
CHLORIDE BLD-SCNC: 102 MMOL/L (ref 99–110)
CHOLESTEROL, TOTAL: 140 MG/DL (ref 0–199)
CO2: 26 MMOL/L (ref 21–32)
CREAT SERPL-MCNC: 0.9 MG/DL (ref 0.9–1.3)
EOSINOPHILS ABSOLUTE: 0.1 K/UL (ref 0–0.6)
EOSINOPHILS RELATIVE PERCENT: 1.4 %
GFR AFRICAN AMERICAN: >60
GFR NON-AFRICAN AMERICAN: >60
GLUCOSE BLD-MCNC: 97 MG/DL (ref 70–99)
HCT VFR BLD CALC: 45.1 % (ref 40.5–52.5)
HDLC SERPL-MCNC: 70 MG/DL (ref 40–60)
HEMOGLOBIN: 14.5 G/DL (ref 13.5–17.5)
LDL CHOLESTEROL CALCULATED: 63 MG/DL
LYMPHOCYTES ABSOLUTE: 1.5 K/UL (ref 1–5.1)
LYMPHOCYTES RELATIVE PERCENT: 31.1 %
MCH RBC QN AUTO: 30.9 PG (ref 26–34)
MCHC RBC AUTO-ENTMCNC: 32.2 G/DL (ref 31–36)
MCV RBC AUTO: 95.8 FL (ref 80–100)
MONOCYTES ABSOLUTE: 0.4 K/UL (ref 0–1.3)
MONOCYTES RELATIVE PERCENT: 8.1 %
NEUTROPHILS ABSOLUTE: 2.8 K/UL (ref 1.7–7.7)
NEUTROPHILS RELATIVE PERCENT: 58.9 %
PDW BLD-RTO: 13.6 % (ref 12.4–15.4)
PLATELET # BLD: 241 K/UL (ref 135–450)
PMV BLD AUTO: 8.2 FL (ref 5–10.5)
POTASSIUM SERPL-SCNC: 5.1 MMOL/L (ref 3.5–5.1)
RBC # BLD: 4.71 M/UL (ref 4.2–5.9)
SODIUM BLD-SCNC: 140 MMOL/L (ref 136–145)
TOTAL PROTEIN: 7.4 G/DL (ref 6.4–8.2)
TRIGL SERPL-MCNC: 34 MG/DL (ref 0–150)
VLDLC SERPL CALC-MCNC: 7 MG/DL
WBC # BLD: 4.8 K/UL (ref 4–11)

## 2021-11-03 PROCEDURE — 90471 IMMUNIZATION ADMIN: CPT | Performed by: INTERNAL MEDICINE

## 2021-11-03 PROCEDURE — 99213 OFFICE O/P EST LOW 20 MIN: CPT | Performed by: INTERNAL MEDICINE

## 2021-11-03 PROCEDURE — 90688 IIV4 VACCINE SPLT 0.5 ML IM: CPT | Performed by: INTERNAL MEDICINE

## 2021-11-03 RX ORDER — ESCITALOPRAM OXALATE 10 MG/1
10 TABLET ORAL DAILY
Qty: 30 TABLET | Refills: 0 | Status: SHIPPED | OUTPATIENT
Start: 2021-11-03 | End: 2021-12-08 | Stop reason: SDUPTHER

## 2021-11-03 RX ORDER — METHYLPHENIDATE HYDROCHLORIDE 10 MG/1
20 TABLET ORAL 2 TIMES DAILY
Qty: 120 TABLET | Refills: 0 | Status: SHIPPED | OUTPATIENT
Start: 2022-01-03 | End: 2022-02-02 | Stop reason: SDUPTHER

## 2021-11-03 RX ORDER — METHYLPHENIDATE HYDROCHLORIDE 10 MG/1
20 TABLET ORAL 2 TIMES DAILY
Qty: 120 TABLET | Refills: 0 | Status: SHIPPED | OUTPATIENT
Start: 2021-12-03 | End: 2022-02-02 | Stop reason: SDUPTHER

## 2021-11-03 RX ORDER — METHYLPHENIDATE HYDROCHLORIDE 10 MG/1
20 TABLET ORAL 2 TIMES DAILY
Qty: 120 TABLET | Refills: 0 | Status: SHIPPED | OUTPATIENT
Start: 2021-11-03 | End: 2022-02-02 | Stop reason: SDUPTHER

## 2021-11-03 NOTE — PROGRESS NOTES
Jaya Snyder  YOB: 1990    Date of Service:  11/3/2021    Chief Complaint:      Chief Complaint   Patient presents with    ADHD    Anxiety       Assessment/Plan:  Angi Irizarry was seen today for adhd and anxiety. Diagnoses and all orders for this visit:    Adjustment disorder with anxiety  Start escitalopram (LEXAPRO) 10 MG tablet; Take 1/2-1 tablet by mouth daily    Attention deficit disorder (ADD) without hyperactivity  -     methylphenidate (RITALIN) 10 MG tablet; Take 2 tablets by mouth 2 times daily for 30 days. -     methylphenidate (RITALIN) 10 MG tablet; Take 2 tablets by mouth 2 times daily for 30 days. -     methylphenidate (RITALIN) 10 MG tablet; Take 2 tablets by mouth 2 times daily for 30 days. Controlled substance agreement signed  -     methylphenidate (RITALIN) 10 MG tablet; Take 2 tablets by mouth 2 times daily for 30 days. -     methylphenidate (RITALIN) 10 MG tablet; Take 2 tablets by mouth 2 times daily for 30 days. -     methylphenidate (RITALIN) 10 MG tablet; Take 2 tablets by mouth 2 times daily for 30 days. Need for influenza vaccination  -     INFLUENZA, QUADV, 3 YRS AND OLDER, IM, MDV, 0.5ML (Lindsay Otto)    Stable and continue on current medications. Return VV 1 month on anxiety. HPI:  Jaya Snyder is a 27 y.o. He complains of anxiety and feeling overwhelmed daily and having dreams with things going bad for the past year. He is trying to go for walks, yard work, gardening and cooking which has helped a little with stress. He denies depression. He was on Prozac for mainly depression in the which didn't help and caused ED.     ADD:  improvement in concentrating and staying on task at work and at home with home schooling his 7 y/o ADHD son on Ritalin 10 mg 2 AM, 2 PM (1 at a time spaced out in the) 6 days a week.  He used to be on Adderall 10 mg bid-tid 2014 and off once he graduated from [address] [place] [address] Way did have GI upset with the med due to Adderall having lactose and he's lactose intolerant      No results found for: LABA1C, LABMICR  Lab Results   Component Value Date     06/18/2020    K 4.8 06/18/2020    CL 99 06/18/2020    CO2 27 06/18/2020    BUN 12 06/18/2020    CREATININE 0.9 06/18/2020    GLUCOSE 92 06/18/2020    CALCIUM 9.5 06/18/2020     Lab Results   Component Value Date    CHOL 142 06/18/2020    TRIG 66 06/18/2020    HDL 60 06/18/2020    LDLCALC 69 06/18/2020     Lab Results   Component Value Date    ALT 17 06/18/2020    AST 28 06/18/2020     No results found for: TSH, T4FREE  Lab Results   Component Value Date    WBC 6.1 06/18/2020    HGB 14.4 06/18/2020    HCT 43.7 06/18/2020    MCV 94.3 06/18/2020     06/18/2020     No results found for: INR   No results found for: PSA   No results found for: [facility]     Patient Active Problem List   Diagnosis    Attention deficit disorder (ADD) without hyperactivity    Controlled substance agreement signed       No Known Allergies  Outpatient Medications Marked as Taking for the 11/3/21 encounter (Office Visit) with Pedro Avery MD   Medication Sig Dispense Refill    cetirizine (ZYRTEC) 10 MG tablet Take 10 mg by mouth daily           Review of Systems: 14 systems were negative except of what was stated on HPI    Nursing note and vitals reviewed. Vitals:    11/03/21 0909   BP: 124/70   Pulse: 60   SpO2: 100%   Weight: 152 lb (68.9 kg)   Height: 6' 2\" (1.88 m)     Wt Readings from Last 3 Encounters:   11/03/21 152 lb (68.9 kg)   07/28/21 153 lb (69.4 kg)   06/18/20 149 lb (67.6 kg)     BP Readings from Last 3 Encounters:   11/03/21 124/70   07/28/21 118/68   06/18/20 124/80     Body mass index is 19.52 kg/m². Constitutional: Patient appears well-developed and well-nourished. No distress. Head: Normocephalic and atraumatic. Neck: Normal range of motion. Neck supple. No thyroidmegaly. Cardiovascular: Normal rate, regular rhythm, normal heart sounds and intact distal pulses. Pulmonary/Chest: Effort normal and breath sounds normal. No stridor. No respiratory distress. No wheezes and no rales. Abdominal: Soft. Bowel sounds are normal. No distension and no mass. No tenderness. No rebound and no guarding. Musculoskeletal: No edema and no tenderness. Skin: No rash or erythema.

## 2021-12-08 ENCOUNTER — TELEMEDICINE (OUTPATIENT)
Dept: INTERNAL MEDICINE CLINIC | Age: 31
End: 2021-12-08
Payer: COMMERCIAL

## 2021-12-08 DIAGNOSIS — F43.22 ADJUSTMENT DISORDER WITH ANXIETY: ICD-10-CM

## 2021-12-08 PROCEDURE — 99212 OFFICE O/P EST SF 10 MIN: CPT | Performed by: INTERNAL MEDICINE

## 2021-12-08 RX ORDER — ESCITALOPRAM OXALATE 10 MG/1
10 TABLET ORAL DAILY
Qty: 30 TABLET | Refills: 1 | Status: SHIPPED | OUTPATIENT
Start: 2021-12-08 | End: 2022-02-02 | Stop reason: SDUPTHER

## 2021-12-08 NOTE — PROGRESS NOTES
Pursuant to the emergency declaration under the [facility] waiver authority and the [employer] Act, this Virtual  Video Visit was conducted, with patient's consent, to reduce the patient's risk of exposure to COVID-19 and provide continuity of care. Service is  provided through a video synchronous discussion virtually to substitute for in-person clinic visit with the patient being at home and Dr. Earl Díaz being at home. Patient consent to the video visit. Date of Service:  12/8/2021    Chief Complaint:      Chief Complaint   Patient presents with    ADHD    Depression       Assessment/Plan:    Darrell Hebert was seen today for adhd and depression. Diagnoses and all orders for this visit:    Adjustment disorder with anxiety  -     escitalopram (LEXAPRO) 10 MG tablet; Take 1 tablet by mouth daily    Stable and continue on current medications. Return VV Feb 3 at 2 ADD and anxiety. HPI:  Hiren Grove is a 27 y.o. Anxiety:  Feeling better but still some mild stress/anxiety daily < 1 hour. He has some decrease in libido so don't want to increase at this time. No more dreams with things going bad for the past year. He denies depression.   He was on Prozac for mainly depression in the which didn't help and caused ED.     ADD:  improvement in concentrating and staying on task at work and at home with home schooling his 7 y/o ADHD son on Ritalin 10 mg 2 AM, 2 PM (1 at a time spaced out in the) 6 days a week.  He used to be on Adderall 10 mg bid-tid 2014 and off once he graduated from [address] [place] [address] Way did have GI upset with the med due to Adderall having lactose and he's lactose intolerant    No results found for: LABA1C, LABMICR  Lab Results   Component Value Date     11/03/2021    K 5.1 11/03/2021     11/03/2021    CO2 26 11/03/2021    BUN 11 11/03/2021    CREATININE 0.9 11/03/2021    GLUCOSE 97 11/03/2021    CALCIUM 9.6 11/03/2021     Lab Results   Component Value Date    CHOL 140 11/03/2021    TRIG 34 11/03/2021    HDL 70 11/03/2021    LDLCALC 63 11/03/2021     Lab Results   Component Value Date    ALT 16 11/03/2021    AST 25 11/03/2021     No results found for: TSH, T4FREE  Lab Results   Component Value Date    WBC 4.8 11/03/2021    HGB 14.5 11/03/2021    HCT 45.1 11/03/2021    MCV 95.8 11/03/2021     11/03/2021     No results found for: INR   No results found for: PSA   No results found for: [facility]     Patient Active Problem List   Diagnosis    Attention deficit disorder (ADD) without hyperactivity    Controlled substance agreement signed       No Known Allergies  Outpatient Medications Marked as Taking for the 12/8/21 encounter (Telemedicine) with Humza Turner MD   Medication Sig Dispense Refill    [START ON 1/3/2022] methylphenidate (RITALIN) 10 MG tablet Take 2 tablets by mouth 2 times daily for 30 days. 120 tablet 0    methylphenidate (RITALIN) 10 MG tablet Take 2 tablets by mouth 2 times daily for 30 days. 120 tablet 0    escitalopram (LEXAPRO) 10 MG tablet Take 1 tablet by mouth daily 30 tablet 0    cetirizine (ZYRTEC) 10 MG tablet Take 10 mg by mouth daily           Review of Systems: 14 systems were negative except of what was stated on HPI    Nursing note and vitals reviewed. There were no vitals filed for this visit. Wt Readings from Last 3 Encounters:   11/03/21 152 lb (68.9 kg)   07/28/21 153 lb (69.4 kg)   06/18/20 149 lb (67.6 kg)     BP Readings from Last 3 Encounters:   11/03/21 124/70   07/28/21 118/68   06/18/20 124/80     There is no height or weight on file to calculate BMI. Constitutional: Patient appears well-developed and well-nourished. No distress. Head: Normocephalic and atraumatic. Skin: No rash or erythema. Psychiatric: Normal mood and affect.  Behavior is normal.

## 2022-02-02 ENCOUNTER — TELEMEDICINE (OUTPATIENT)
Dept: INTERNAL MEDICINE CLINIC | Age: 32
End: 2022-02-02
Payer: COMMERCIAL

## 2022-02-02 DIAGNOSIS — Z79.899 CONTROLLED SUBSTANCE AGREEMENT SIGNED: ICD-10-CM

## 2022-02-02 DIAGNOSIS — F43.22 ADJUSTMENT DISORDER WITH ANXIETY: ICD-10-CM

## 2022-02-02 DIAGNOSIS — F98.8 ATTENTION DEFICIT DISORDER (ADD) WITHOUT HYPERACTIVITY: Primary | ICD-10-CM

## 2022-02-02 PROCEDURE — 99213 OFFICE O/P EST LOW 20 MIN: CPT | Performed by: INTERNAL MEDICINE

## 2022-02-02 RX ORDER — METHYLPHENIDATE HYDROCHLORIDE 10 MG/1
20 TABLET ORAL 2 TIMES DAILY
Qty: 120 TABLET | Refills: 0 | Status: SHIPPED | OUTPATIENT
Start: 2022-02-02 | End: 2022-04-05 | Stop reason: SDUPTHER

## 2022-02-02 RX ORDER — METHYLPHENIDATE HYDROCHLORIDE 10 MG/1
20 TABLET ORAL 2 TIMES DAILY
Qty: 120 TABLET | Refills: 0 | Status: SHIPPED | OUTPATIENT
Start: 2022-03-02 | End: 2022-05-03 | Stop reason: SDUPTHER

## 2022-02-02 RX ORDER — ESCITALOPRAM OXALATE 10 MG/1
10 TABLET ORAL DAILY
Qty: 30 TABLET | Refills: 5 | Status: SHIPPED | OUTPATIENT
Start: 2022-02-02 | End: 2022-07-28 | Stop reason: SDUPTHER

## 2022-02-02 RX ORDER — METHYLPHENIDATE HYDROCHLORIDE 10 MG/1
20 TABLET ORAL 2 TIMES DAILY
Qty: 120 TABLET | Refills: 0 | Status: SHIPPED | OUTPATIENT
Start: 2022-04-02 | End: 2022-05-03 | Stop reason: SDUPTHER

## 2022-02-02 NOTE — PROGRESS NOTES
Pursuant to the emergency declaration under the [facility] waiver authority and the [employer] Act, this Virtual  Video Visit was conducted, with patient's consent, to reduce the patient's risk of exposure to COVID-19 and provide continuity of care. Service is  provided through a video synchronous discussion virtually to substitute for in-person clinic visit with the patient being at home and Dr. Yvette Meléndez being at home. Patient consent to the video visit. Date of Service:  2/2/2022    Chief Complaint:      Chief Complaint   Patient presents with    ADHD    Anxiety       Assessment/Plan:    Juan R Barbour was seen today for adhd and anxiety. Diagnoses and all orders for this visit:    Attention deficit disorder (ADD) without hyperactivity  -     methylphenidate (RITALIN) 10 MG tablet; Take 2 tablets by mouth 2 times daily for 30 days. -     methylphenidate (RITALIN) 10 MG tablet; Take 2 tablets by mouth 2 times daily for 30 days. -     methylphenidate (RITALIN) 10 MG tablet; Take 2 tablets by mouth 2 times daily for 30 days. Controlled substance agreement signed  -     methylphenidate (RITALIN) 10 MG tablet; Take 2 tablets by mouth 2 times daily for 30 days. -     methylphenidate (RITALIN) 10 MG tablet; Take 2 tablets by mouth 2 times daily for 30 days. -     methylphenidate (RITALIN) 10 MG tablet; Take 2 tablets by mouth 2 times daily for 30 days. Adjustment disorder with anxiety  -     escitalopram (LEXAPRO) 10 MG tablet; Take 1 tablet by mouth daily    Stable and continue on current medications. Return VV May 3 at 3 PM ADD.       HPI:  Efrain Brothers is a 27 y.o.     ADD:  improvement in concentrating and staying on task at work and at home with home schooling his 7 y/o ADHD son on Ritalin 10 mg 2 AM, 2 PM (1 at a time spaced out in the) 6 days a week.  He used to be on Adderall 10 mg bid-tid 2014 and off once he graduated from [address] [place] [address] Way did have GI upset with the med due to Adderall having lactose and he's lactose intolerant     Anxiety:  Feeling better but still some mild stress/anxiety daily < 1 hour. He has some decrease in libido so don't want to increase at this time. No more dreams with things going bad for the past year.  He denies depression. Tre Pedro was on Prozac for mainly depression in the which didn't help and caused ED. No results found for: LABA1C, LABMICR  Lab Results   Component Value Date     11/03/2021    K 5.1 11/03/2021     11/03/2021    CO2 26 11/03/2021    BUN 11 11/03/2021    CREATININE 0.9 11/03/2021    GLUCOSE 97 11/03/2021    CALCIUM 9.6 11/03/2021     Lab Results   Component Value Date    CHOL 140 11/03/2021    TRIG 34 11/03/2021    HDL 70 11/03/2021    LDLCALC 63 11/03/2021     Lab Results   Component Value Date    ALT 16 11/03/2021    AST 25 11/03/2021     No results found for: TSH, T4FREE  Lab Results   Component Value Date    WBC 4.8 11/03/2021    HGB 14.5 11/03/2021    HCT 45.1 11/03/2021    MCV 95.8 11/03/2021     11/03/2021     No results found for: INR   No results found for: PSA   No results found for: LABURIC     Patient Active Problem List   Diagnosis    Attention deficit disorder (ADD) without hyperactivity    Controlled substance agreement signed       No Known Allergies  Outpatient Medications Marked as Taking for the 2/2/22 encounter (Telemedicine) with Gi Turner MD   Medication Sig Dispense Refill    escitalopram (LEXAPRO) 10 MG tablet Take 1 tablet by mouth daily 30 tablet 1    methylphenidate (RITALIN) 10 MG tablet Take 2 tablets by mouth 2 times daily for 30 days. 120 tablet 0    cetirizine (ZYRTEC) 10 MG tablet Take 10 mg by mouth daily           Review of Systems: 14 systems were negative except of what was stated on HPI    Nursing note and vitals reviewed. There were no vitals filed for this visit.   Wt Readings from Last 3

## 2022-04-05 DIAGNOSIS — Z79.899 CONTROLLED SUBSTANCE AGREEMENT SIGNED: ICD-10-CM

## 2022-04-05 DIAGNOSIS — F98.8 ATTENTION DEFICIT DISORDER (ADD) WITHOUT HYPERACTIVITY: ICD-10-CM

## 2022-04-05 RX ORDER — METHYLPHENIDATE HYDROCHLORIDE 10 MG/1
20 TABLET ORAL 2 TIMES DAILY
Qty: 68 TABLET | Refills: 0 | Status: SHIPPED | OUTPATIENT
Start: 2022-04-05 | End: 2022-05-03 | Stop reason: SDUPTHER

## 2022-05-03 ENCOUNTER — TELEMEDICINE (OUTPATIENT)
Dept: INTERNAL MEDICINE CLINIC | Age: 32
End: 2022-05-03
Payer: COMMERCIAL

## 2022-05-03 DIAGNOSIS — F43.22 ADJUSTMENT DISORDER WITH ANXIETY: ICD-10-CM

## 2022-05-03 DIAGNOSIS — Z79.899 CONTROLLED SUBSTANCE AGREEMENT SIGNED: ICD-10-CM

## 2022-05-03 DIAGNOSIS — F98.8 ATTENTION DEFICIT DISORDER (ADD) WITHOUT HYPERACTIVITY: Primary | ICD-10-CM

## 2022-05-03 PROCEDURE — 99213 OFFICE O/P EST LOW 20 MIN: CPT | Performed by: INTERNAL MEDICINE

## 2022-05-03 RX ORDER — METHYLPHENIDATE HYDROCHLORIDE 10 MG/1
20 TABLET ORAL 2 TIMES DAILY
Qty: 120 TABLET | Refills: 0 | Status: SHIPPED | OUTPATIENT
Start: 2022-05-07 | End: 2022-07-28 | Stop reason: SDUPTHER

## 2022-05-03 RX ORDER — METHYLPHENIDATE HYDROCHLORIDE 10 MG/1
20 TABLET ORAL 2 TIMES DAILY
Qty: 120 TABLET | Refills: 0 | Status: SHIPPED | OUTPATIENT
Start: 2022-07-07 | End: 2022-07-28 | Stop reason: SDUPTHER

## 2022-05-03 RX ORDER — METHYLPHENIDATE HYDROCHLORIDE 10 MG/1
20 TABLET ORAL 2 TIMES DAILY
Qty: 120 TABLET | Refills: 0 | Status: SHIPPED | OUTPATIENT
Start: 2022-06-07 | End: 2022-07-28 | Stop reason: SDUPTHER

## 2022-05-03 NOTE — PROGRESS NOTES
Pursuant to the emergency declaration under the [facility], [facility] waiver authority and the [employer] Act, this Virtual  Video Visit was conducted, with patient's consent, to reduce the patient's risk of exposure to COVID-19 and provide continuity of care. Service is  provided through a video synchronous discussion virtually to substitute for in-person clinic visit with the patient being at home and Dr. Michoacano Tripathi being at home. Patient consent to the video visit. Date of Service:  5/3/2022    Chief Complaint:      Chief Complaint   Patient presents with    ADHD       Assessment/Plan:    Lamar Garza was seen today for adhd. Diagnoses and all orders for this visit:    Attention deficit disorder (ADD) without hyperactivity  -     methylphenidate (RITALIN) 10 MG tablet; Take 2 tablets by mouth 2 times daily for 30 days. -     methylphenidate (RITALIN) 10 MG tablet; Take 2 tablets by mouth 2 times daily for 30 days. -     methylphenidate (RITALIN) 10 MG tablet; Take 2 tablets by mouth 2 times daily for 30 days. Controlled substance agreement signed  -     methylphenidate (RITALIN) 10 MG tablet; Take 2 tablets by mouth 2 times daily for 30 days. -     methylphenidate (RITALIN) 10 MG tablet; Take 2 tablets by mouth 2 times daily for 30 days. -     methylphenidate (RITALIN) 10 MG tablet; Take 2 tablets by mouth 2 times daily for 30 days. Adjustment disorder with anxiety      Stable and continue on current medications. Return July 28 at 7:50 Fasting Physical.      HPI:  Naren Ponce is a 27 y.o.       ADD:  improvement in concentrating and staying on task at work on Ritalin 10 mg 2 AM, 2 PM (1 at a time spaced out in the) 6 days a week.  He used to be on Adderall 10 mg bid-tid 2014 and off once he graduated from [address] did have GI upset with the med due to Adderall having lactose and he's lactose intolerant     Anxiety:  stable on Lexapro 10 mg daily and decrease libido side effect slowly improving over time.  No more dreams with things going bad for the past year.  He denies depression.  He was on Prozac for mainly depression in the which didn't help and caused ED. No results found for: LABA1C, LABMICR  Lab Results   Component Value Date     11/03/2021    K 5.1 11/03/2021     11/03/2021    CO2 26 11/03/2021    BUN 11 11/03/2021    CREATININE 0.9 11/03/2021    GLUCOSE 97 11/03/2021    CALCIUM 9.6 11/03/2021     Lab Results   Component Value Date    CHOL 140 11/03/2021    TRIG 34 11/03/2021    HDL 70 11/03/2021    LDLCALC 63 11/03/2021     Lab Results   Component Value Date    ALT 16 11/03/2021    AST 25 11/03/2021     No results found for: TSH, T4FREE, T3FREE  Lab Results   Component Value Date    WBC 4.8 11/03/2021    HGB 14.5 11/03/2021    HCT 45.1 11/03/2021    MCV 95.8 11/03/2021     11/03/2021     No results found for: INR   No results found for: PSA   No results found for: LABURIC     Patient Active Problem List   Diagnosis    Attention deficit disorder (ADD) without hyperactivity    Controlled substance agreement signed       No Known Allergies  Outpatient Medications Marked as Taking for the 5/3/22 encounter (Telemedicine) with Isabel Turner MD   Medication Sig Dispense Refill    escitalopram (LEXAPRO) 10 MG tablet Take 1 tablet by mouth daily 30 tablet 5    cetirizine (ZYRTEC) 10 MG tablet Take 10 mg by mouth daily           Review of Systems: 14 systems were negative except of what was stated on HPI    Nursing note and vitals reviewed. There were no vitals filed for this visit. Wt Readings from Last 3 Encounters:   11/03/21 152 lb (68.9 kg)   07/28/21 153 lb (69.4 kg)   06/18/20 149 lb (67.6 kg)     BP Readings from Last 3 Encounters:   11/03/21 124/70   07/28/21 118/68   06/18/20 124/80     There is no height or weight on file to calculate BMI.   Constitutional: Patient appears well-developed and well-nourished. No distress. Head: Normocephalic and atraumatic. Psychiatric: Normal mood and affect.  Behavior is normal.

## 2022-07-28 ENCOUNTER — OFFICE VISIT (OUTPATIENT)
Dept: INTERNAL MEDICINE CLINIC | Age: 32
End: 2022-07-28
Payer: COMMERCIAL

## 2022-07-28 VITALS
HEART RATE: 48 BPM | OXYGEN SATURATION: 97 % | SYSTOLIC BLOOD PRESSURE: 116 MMHG | HEIGHT: 74 IN | DIASTOLIC BLOOD PRESSURE: 64 MMHG | WEIGHT: 159 LBS | BODY MASS INDEX: 20.41 KG/M2

## 2022-07-28 DIAGNOSIS — F43.22 ADJUSTMENT DISORDER WITH ANXIETY: ICD-10-CM

## 2022-07-28 DIAGNOSIS — Z00.00 ENCOUNTER FOR WELL ADULT EXAM WITHOUT ABNORMAL FINDINGS: Primary | ICD-10-CM

## 2022-07-28 DIAGNOSIS — Z79.899 CONTROLLED SUBSTANCE AGREEMENT SIGNED: ICD-10-CM

## 2022-07-28 DIAGNOSIS — F98.8 ATTENTION DEFICIT DISORDER (ADD) WITHOUT HYPERACTIVITY: ICD-10-CM

## 2022-07-28 DIAGNOSIS — M77.01 MEDIAL EPICONDYLITIS OF ELBOW, RIGHT: ICD-10-CM

## 2022-07-28 LAB
BASOPHILS ABSOLUTE: 0.1 K/UL (ref 0–0.2)
BASOPHILS RELATIVE PERCENT: 1.3 %
EOSINOPHILS ABSOLUTE: 0.1 K/UL (ref 0–0.6)
EOSINOPHILS RELATIVE PERCENT: 1.4 %
HCT VFR BLD CALC: 42.4 % (ref 40.5–52.5)
HEMOGLOBIN: 14.5 G/DL (ref 13.5–17.5)
LYMPHOCYTES ABSOLUTE: 1.5 K/UL (ref 1–5.1)
LYMPHOCYTES RELATIVE PERCENT: 27.2 %
MCH RBC QN AUTO: 32 PG (ref 26–34)
MCHC RBC AUTO-ENTMCNC: 34.2 G/DL (ref 31–36)
MCV RBC AUTO: 93.7 FL (ref 80–100)
MONOCYTES ABSOLUTE: 0.5 K/UL (ref 0–1.3)
MONOCYTES RELATIVE PERCENT: 8.5 %
NEUTROPHILS ABSOLUTE: 3.4 K/UL (ref 1.7–7.7)
NEUTROPHILS RELATIVE PERCENT: 61.6 %
PDW BLD-RTO: 13.6 % (ref 12.4–15.4)
PLATELET # BLD: 303 K/UL (ref 135–450)
PMV BLD AUTO: 7.3 FL (ref 5–10.5)
RBC # BLD: 4.52 M/UL (ref 4.2–5.9)
WBC # BLD: 5.6 K/UL (ref 4–11)

## 2022-07-28 PROCEDURE — 99395 PREV VISIT EST AGE 18-39: CPT | Performed by: INTERNAL MEDICINE

## 2022-07-28 PROCEDURE — 36415 COLL VENOUS BLD VENIPUNCTURE: CPT | Performed by: INTERNAL MEDICINE

## 2022-07-28 RX ORDER — METHYLPHENIDATE HYDROCHLORIDE 10 MG/1
20 TABLET ORAL 2 TIMES DAILY
Qty: 120 TABLET | Refills: 0 | Status: SHIPPED | OUTPATIENT
Start: 2022-08-07 | End: 2022-10-27 | Stop reason: SDUPTHER

## 2022-07-28 RX ORDER — METHYLPHENIDATE HYDROCHLORIDE 10 MG/1
20 TABLET ORAL 2 TIMES DAILY
Qty: 120 TABLET | Refills: 0 | Status: SHIPPED | OUTPATIENT
Start: 2022-09-07 | End: 2022-10-27 | Stop reason: SDUPTHER

## 2022-07-28 RX ORDER — ESCITALOPRAM OXALATE 10 MG/1
10 TABLET ORAL DAILY
Qty: 30 TABLET | Refills: 11 | Status: SHIPPED | OUTPATIENT
Start: 2022-07-28

## 2022-07-28 RX ORDER — METHYLPHENIDATE HYDROCHLORIDE 10 MG/1
20 TABLET ORAL 2 TIMES DAILY
Qty: 120 TABLET | Refills: 0 | Status: SHIPPED | OUTPATIENT
Start: 2022-10-07 | End: 2022-10-27 | Stop reason: SDUPTHER

## 2022-07-28 ASSESSMENT — PATIENT HEALTH QUESTIONNAIRE - PHQ9
SUM OF ALL RESPONSES TO PHQ QUESTIONS 1-9: 0
SUM OF ALL RESPONSES TO PHQ9 QUESTIONS 1 & 2: 0
SUM OF ALL RESPONSES TO PHQ QUESTIONS 1-9: 0
SUM OF ALL RESPONSES TO PHQ QUESTIONS 1-9: 0
2. FEELING DOWN, DEPRESSED OR HOPELESS: 0
SUM OF ALL RESPONSES TO PHQ QUESTIONS 1-9: 0
1. LITTLE INTEREST OR PLEASURE IN DOING THINGS: 0

## 2022-07-28 NOTE — PROGRESS NOTES
[facility]  History and Physical  Radha Churchill M.D. Felicity Man  YOB: 1990    Date of Service:  7/28/2022    Chief Complaint:   Felicity Man is a 34 y.o. male who presents for   Chief Complaint   Patient presents with    Annual Exam       Assessment/Plan:    Yolanda Enriquez was seen today for annual exam.    Diagnoses and all orders for this visit:    Encounter for well adult exam without abnormal findings  -     Lipid Panel  -     Comprehensive Metabolic Panel  -     CBC with Auto Differential    Attention deficit disorder (ADD) without hyperactivity  -     methylphenidate (RITALIN) 10 MG tablet; Take 2 tablets by mouth in the morning and 2 tablets before bedtime. Do all this for 30 days. -     methylphenidate (RITALIN) 10 MG tablet; Take 2 tablets by mouth in the morning and 2 tablets before bedtime. Do all this for 30 days. -     methylphenidate (RITALIN) 10 MG tablet; Take 2 tablets by mouth in the morning and 2 tablets before bedtime. Do all this for 30 days. -     Cancel: DRUG SCREEN MULTI URINE  -     Cancel: Drug Panel-PM-HI Res-UR Interp-A; Future  -     Cancel: Drug Panel-PM-HI Res-UR Interp-A  -     Drug Panel-PM-HI Res-UR Interp-A    Controlled substance agreement signed  -     methylphenidate (RITALIN) 10 MG tablet; Take 2 tablets by mouth in the morning and 2 tablets before bedtime. Do all this for 30 days. -     methylphenidate (RITALIN) 10 MG tablet; Take 2 tablets by mouth in the morning and 2 tablets before bedtime. Do all this for 30 days. -     methylphenidate (RITALIN) 10 MG tablet; Take 2 tablets by mouth in the morning and 2 tablets before bedtime. Do all this for 30 days. -     Cancel: DRUG SCREEN MULTI URINE  -     Cancel: Drug Panel-PM-HI Res-UR Interp-A; Future  -     Cancel: Drug Panel-PM-HI Res-UR Interp-A  -     Drug Panel-PM-HI Res-UR Interp-A    Adjustment disorder with anxiety  -     escitalopram (LEXAPRO) 10 MG tablet;  Take 1 tablet by mouth in the morning. Medial epicondylitis of elbow, right  Patient to wear forearm band to allow area to heal.  Avoid repetitive wrist motion    Return VV 3 months ADD. HPI: Here for Annual Physical and Follow up. ADD:  improvement in concentrating and staying on task at work on Ritalin 10 mg 2 AM, 2 PM (1 at a time spaced out in the) 6 days a week. He used to be on Adderall 10 mg bid-tid 2014 and off once he graduated from [employer]. He did have GI upset with the med due to Adderall having lactose and he's lactose intolerant     Anxiety:  stable on Lexapro 10 mg daily and decrease libido side effect slowly improving over time. No more dreams with things going bad for the past year. He denies depression. He was on Prozac for mainly depression in the which didn't help and caused ED.     No results found for: LABA1C, LABMICR  Lab Results   Component Value Date     11/03/2021    K 5.1 11/03/2021     11/03/2021    CO2 26 11/03/2021    BUN 11 11/03/2021    CREATININE 0.9 11/03/2021    GLUCOSE 97 11/03/2021    CALCIUM 9.6 11/03/2021     Lab Results   Component Value Date/Time    CHOL 140 11/03/2021 09:32 AM    TRIG 34 11/03/2021 09:32 AM    HDL 70 11/03/2021 09:32 AM    LDLCALC 63 11/03/2021 09:32 AM     Lab Results   Component Value Date    ALT 16 11/03/2021    AST 25 11/03/2021     No results found for: TSH, T4FREE, T3FREE  Lab Results   Component Value Date    WBC 4.8 11/03/2021    HGB 14.5 11/03/2021    HCT 45.1 11/03/2021    MCV 95.8 11/03/2021     11/03/2021     No results found for: INR   No results found for: PSA   No results found for: LABURIC     Wt Readings from Last 3 Encounters:   07/28/22 159 lb (72.1 kg)   11/03/21 152 lb (68.9 kg)   07/28/21 153 lb (69.4 kg)     BP Readings from Last 3 Encounters:   07/28/22 116/64   11/03/21 124/70   07/28/21 118/68       Patient Active Problem List   Diagnosis    Attention deficit disorder (ADD) without hyperactivity    Controlled substance agreement signed       No Known Allergies  Outpatient Medications Marked as Taking for the 7/28/22 encounter (Office Visit) with Keven Bowman MD   Medication Sig Dispense Refill    methylphenidate (RITALIN) 10 MG tablet Take 2 tablets by mouth 2 times daily for 30 days. 120 tablet 0    escitalopram (LEXAPRO) 10 MG tablet Take 1 tablet by mouth daily 30 tablet 5    cetirizine (ZYRTEC) 10 MG tablet Take 10 mg by mouth daily         Past Medical History:   Diagnosis Date    ADHD (attention deficit hyperactivity disorder)      History reviewed. No pertinent surgical history. Family History   Problem Relation Age of Onset    Atrial Fibrillation Mother     Stroke Father     Heart Disease Paternal Grandfather     Stroke Paternal Grandfather     Heart Disease Maternal Grandfather      Social History     Socioeconomic History    Marital status:      Spouse name: Not on file    Number of children: Not on file    Years of education: Not on file    Highest education level: Not on file   Occupational History    Not on file   Tobacco Use    Smoking status: Never    Smokeless tobacco: Never   Vaping Use    Vaping Use: Never used   Substance and Sexual Activity    Alcohol use: Yes     Comment: socially    Drug use: Never    Sexual activity: Yes   Other Topics Concern    Not on file   Social History Narrative    Not on file     Social Determinants of Health     Financial Resource Strain: Low Risk     Difficulty of Paying Living Expenses: Not hard at all   Food Insecurity: No Food Insecurity    Worried About Running Out of Food in the Last Year: Never true    Ran Out of Food in the Last Year: Never true   Transportation Needs: Not on file   Physical Activity: Not on file   Stress: Not on file   Social Connections: Not on file   Intimate Partner Violence: Not on file   Housing Stability: Not on file       Review of Systems:  A comprehensive review of systems was negative except for what was noted in the HPI.      Physical Exam:   Vitals:    07/28/22 0752   BP: 116/64   Pulse: (!) 48   SpO2: 97%   Weight: 159 lb (72.1 kg)   Height: 6' 2\" (1.88 m)     Body mass index is 20.41 kg/m². Constitutional: He is oriented to person, place, and time. He appears well-developed and well-nourished. No distress. HEENT:   Head: Normocephalic and atraumatic. Right Ear: Tympanic membrane, external ear and ear canal normal.   Left Ear: Tympanic membrane, external ear and ear canal normal.   Mouth/Throat: Oropharynx is clear and moist and mucous membranes are normal. No oropharyngeal exudate or posterior oropharyngeal erythema. He has no cervical adenopathy. Eyes: Conjunctivae and extraocular motions are normal. Pupils are equal, round, and reactive to light. Neck:  Supple. No JVD present. Carotid bruit is not present. No mass and no thyromegaly present. Cardiovascular: Normal rate, regular rhythm, normal heart sounds and intact distal pulses. Pulmonary/Chest: Effort normal and breath sounds normal. No respiratory distress. He has no wheezes, rhonchi or rales. Abdominal: Soft, non-tender. Bowel sounds and aorta are normal. There is no organomegaly, mass or bruit. Musculoskeletal: Normal range of motion. He exhibits no edema. Neurological: He is alert and oriented to person, place, and time. He has normal reflexes. No cranial nerve deficit. Coordination normal.   Skin: Skin is warm, dry and intact. No suspicious lesions are noted.     Preventive Care:  Health Maintenance   Topic Date Due    Hepatitis A vaccine (2 of 2 - 2-dose series) 01/25/2007    Depression Screen  07/28/2022    Flu vaccine (1) 09/01/2022    DTaP/Tdap/Td vaccine (4 - Td or Tdap) 09/24/2028    COVID-19 Vaccine  Completed    Hepatitis B vaccine  Aged Out    Hib vaccine  Aged Out    Meningococcal (ACWY) vaccine  Aged Out    Pneumococcal 0-64 years Vaccine  Aged Out    Varicella vaccine  Discontinued    Hepatitis C screen  Discontinued    HIV screen  Discontinued Recommendations for Preventive Services Due: see orders and patient instructions/AVS.

## 2022-07-28 NOTE — PATIENT INSTRUCTIONS
Treatment can help. Talk to your doctor about whether you have any risk factors for sexually transmitted infections (STIs). You can help prevent STIs if you wait to have sex with a new partner (or partners) until you've each been tested for STIs. It also helps if you use condoms (male or female condoms) and if you limit your sex partners to one person who only has sex with you. Vaccines are available for some STIs, such as HPV. Use birth control if it's important to you to prevent pregnancy. Talk with your doctor about the choices available and what might be best for you. If you think you may have a problem with alcohol or drug use, talk to your doctor. This includes prescription medicines (such as amphetamines and opioids) and illegal drugs (such as cocaine and methamphetamine). Your doctor can help you figure out what type of treatment is best for you. Protect your skin from too much sun. When you're outdoors from 10 a.m. to 4 p.m., stay in the shade or cover up with clothing and a hat with a wide brim. Wear sunglasses that block UV rays. Even when it's cloudy, put broad-spectrum sunscreen (SPF 30 or higher) on any exposed skin. See a dentist one or two times a year for checkups and to have your teeth cleaned. Wear a seat belt in the car. When should you call for help? Watch closely for changes in your health, and be sure to contact your doctor if you have any problems or symptoms that concern you. Where can you learn more? Go to [email or web address] org and sign in to your [employer] account. Enter P072 in the [facility] box to learn more about \"Well Visit, Ages 25 to 48: Care Instructions. \"     If you do not have an account, please click on the \"Sign Up Now\" link. Current as of: October 6, 2021               Content Version: 13.3  © 2006-2022 Healthwise, Incorporated. Care instructions adapted under license by [facility] ([facility]).  If you have questions about a medical condition

## 2022-07-29 LAB
A/G RATIO: 2 (ref 1.1–2.2)
ALBUMIN SERPL-MCNC: 4.8 G/DL (ref 3.4–5)
ALP BLD-CCNC: 63 U/L (ref 40–129)
ALT SERPL-CCNC: 18 U/L (ref 10–40)
ANION GAP SERPL CALCULATED.3IONS-SCNC: 9 MMOL/L (ref 3–16)
AST SERPL-CCNC: 23 U/L (ref 15–37)
BILIRUB SERPL-MCNC: 0.4 MG/DL (ref 0–1)
BUN BLDV-MCNC: 14 MG/DL (ref 7–20)
CALCIUM SERPL-MCNC: 9.1 MG/DL (ref 8.3–10.6)
CHLORIDE BLD-SCNC: 101 MMOL/L (ref 99–110)
CHOLESTEROL, TOTAL: 165 MG/DL (ref 0–199)
CO2: 28 MMOL/L (ref 21–32)
CREAT SERPL-MCNC: 0.8 MG/DL (ref 0.9–1.3)
GFR AFRICAN AMERICAN: >60
GFR NON-AFRICAN AMERICAN: >60
GLUCOSE BLD-MCNC: 88 MG/DL (ref 70–99)
HDLC SERPL-MCNC: 66 MG/DL (ref 40–60)
LDL CHOLESTEROL CALCULATED: 84 MG/DL
POTASSIUM SERPL-SCNC: 4.6 MMOL/L (ref 3.5–5.1)
SODIUM BLD-SCNC: 138 MMOL/L (ref 136–145)
TOTAL PROTEIN: 7.2 G/DL (ref 6.4–8.2)
TRIGL SERPL-MCNC: 73 MG/DL (ref 0–150)
VLDLC SERPL CALC-MCNC: 15 MG/DL

## 2022-08-02 LAB
6-ACETYLMORPHINE: NOT DETECTED
7-AMINOCLONAZEPAM: NOT DETECTED
ALPHA-OH-ALPRAZOLAM: NOT DETECTED
ALPHA-OH-MIDAZOLAM, URINE: NOT DETECTED
ALPRAZOLAM: NOT DETECTED
AMPHETAMINE: NOT DETECTED
BARBITURATES: NOT DETECTED
BENZOYLECGONINE: NOT DETECTED
BUPRENORPHINE: NOT DETECTED
CARISOPRODOL: NOT DETECTED
CLONAZEPAM: NOT DETECTED
CODEINE: NOT DETECTED
CREATININE URINE: 212.9 MG/DL (ref 20–400)
DIAZEPAM: NOT DETECTED
DRUGS EXPECTED: NORMAL
EER PAIN MGT DRUG PANEL, HIGH RES/EMIT U: NORMAL
ETHYL GLUCURONIDE: PRESENT
FENTANYL: NOT DETECTED
GABAPENTIN: NOT DETECTED
HYDROCODONE: NOT DETECTED
HYDROMORPHONE: NOT DETECTED
LORAZEPAM: NOT DETECTED
MARIJUANA METABOLITE: PRESENT
MDA: NOT DETECTED
MDEA: NOT DETECTED
MDMA URINE: NOT DETECTED
MEPERIDINE: NOT DETECTED
METHADONE: NOT DETECTED
METHAMPHETAMINE: NOT DETECTED
METHYLPHENIDATE: NOT DETECTED
MIDAZOLAM: NOT DETECTED
MORPHINE: NOT DETECTED
NALOXONE: NOT DETECTED
NORBUPRENORPHINE, FREE: NOT DETECTED
NORDIAZEPAM: NOT DETECTED
NORFENTANYL: NOT DETECTED
NORHYDROCODONE, URINE: NOT DETECTED
NOROXYCODONE: NOT DETECTED
NOROXYMORPHONE, URINE: NOT DETECTED
OXAZEPAM: NOT DETECTED
OXYCODONE: NOT DETECTED
OXYMORPHONE: NOT DETECTED
PAIN MANAGEMENT DRUG PANEL: NORMAL
PAIN MANAGEMENT DRUG PANEL: NORMAL
PCP: NOT DETECTED
PHENTERMINE: NOT DETECTED
PREGABALIN: NOT DETECTED
TAPENTADOL, URINE: NOT DETECTED
TAPENTADOL-O-SULFATE, URINE: NOT DETECTED
TEMAZEPAM: NOT DETECTED
TRAMADOL: NOT DETECTED
ZOLPIDEM: NOT DETECTED

## 2022-10-27 ENCOUNTER — TELEMEDICINE (OUTPATIENT)
Dept: INTERNAL MEDICINE CLINIC | Age: 32
End: 2022-10-27
Payer: COMMERCIAL

## 2022-10-27 DIAGNOSIS — Z79.899 CONTROLLED SUBSTANCE AGREEMENT SIGNED: ICD-10-CM

## 2022-10-27 DIAGNOSIS — F98.8 ATTENTION DEFICIT DISORDER (ADD) WITHOUT HYPERACTIVITY: ICD-10-CM

## 2022-10-27 PROCEDURE — 99213 OFFICE O/P EST LOW 20 MIN: CPT | Performed by: INTERNAL MEDICINE

## 2022-10-27 RX ORDER — METHYLPHENIDATE HYDROCHLORIDE 10 MG/1
20 TABLET ORAL 2 TIMES DAILY
Qty: 120 TABLET | Refills: 0 | Status: SHIPPED | OUTPATIENT
Start: 2022-11-07 | End: 2022-12-07

## 2022-10-27 RX ORDER — METHYLPHENIDATE HYDROCHLORIDE 10 MG/1
20 TABLET ORAL 2 TIMES DAILY
Qty: 120 TABLET | Refills: 0 | Status: SHIPPED | OUTPATIENT
Start: 2022-12-07 | End: 2023-01-06

## 2022-10-27 RX ORDER — METHYLPHENIDATE HYDROCHLORIDE 10 MG/1
20 TABLET ORAL 2 TIMES DAILY
Qty: 120 TABLET | Refills: 0 | Status: SHIPPED | OUTPATIENT
Start: 2023-01-07 | End: 2023-02-06

## 2022-10-27 NOTE — PROGRESS NOTES
Pursuant to the emergency declaration under the [facility], [facility] waiver authority and the [email or web address] Act, this Virtual  Video Visit was conducted, with patient's consent, to reduce the patient's risk of exposure to COVID-19 and provide continuity of care. Service is  provided through a video synchronous discussion virtually to substitute for in-person clinic visit with the patient being at home and Dr. Ju Sorto being at home. Patient consent to the video visit. Date of Service:  10/27/2022    Chief Complaint:      Chief Complaint   Patient presents with    ADHD              Assessment/Plan:    Kate Gonzales was seen today for adhd. Diagnoses and all orders for this visit:    Attention deficit disorder (ADD) without hyperactivity  -     methylphenidate (RITALIN) 10 MG tablet; Take 2 tablets by mouth 2 times daily for 30 days. -     methylphenidate (RITALIN) 10 MG tablet; Take 2 tablets by mouth 2 times daily for 30 days. -     methylphenidate (RITALIN) 10 MG tablet; Take 2 tablets by mouth 2 times daily for 30 days. Controlled substance agreement signed  -     methylphenidate (RITALIN) 10 MG tablet; Take 2 tablets by mouth 2 times daily for 30 days. -     methylphenidate (RITALIN) 10 MG tablet; Take 2 tablets by mouth 2 times daily for 30 days. -     methylphenidate (RITALIN) 10 MG tablet; Take 2 tablets by mouth 2 times daily for 30 days. Stable and continue on current medications. Return VV Feb 7 at 1:10 ADD. HPI:  Petra Ndiaye is a 34 y.o. ADD:  improvement in concentrating and staying on task at work on Ritalin 10 mg 2 AM, 2 PM (1 at a time spaced out in the) 6 days a week. He used to be on Adderall 10 mg bid-tid 2014 and off once he graduated [facility] [employer].   He did have GI upset with the med due to Adderall having lactose and he's lactose intolerant     Anxiety:  stable on Lexapro 10 mg daily and decrease libido side effect slowly improving over time. No more dreams with things going bad for the past year. He denies depression. He was on Prozac for mainly depression in the which didn't help and caused ED. No results found for: Christiano Hines  Lab Results   Component Value Date     07/28/2022    K 4.6 07/28/2022     07/28/2022    CO2 28 07/28/2022    BUN 14 07/28/2022    CREATININE 0.8 (L) 07/28/2022    GLUCOSE 88 07/28/2022    CALCIUM 9.1 07/28/2022     Lab Results   Component Value Date/Time    CHOL 165 07/28/2022 08:05 AM    TRIG 73 07/28/2022 08:05 AM    HDL 66 07/28/2022 08:05 AM    LDLCALC 84 07/28/2022 08:05 AM     Lab Results   Component Value Date    ALT 18 07/28/2022    AST 23 07/28/2022     No results found for: TSH, T4FREE, T3FREE  Lab Results   Component Value Date    WBC 5.6 07/28/2022    HGB 14.5 07/28/2022    HCT 42.4 07/28/2022    MCV 93.7 07/28/2022     07/28/2022     No results found for: INR   No results found for: PSA   No results found for: [facility]     Patient Active Problem List   Diagnosis    Attention deficit disorder (ADD) without hyperactivity    Controlled substance agreement signed       No Known Allergies  Outpatient Medications Marked as Taking for the 10/27/22 encounter (Telemedicine) with Kenzie Turner MD   Medication Sig Dispense Refill    methylphenidate (RITALIN) 10 MG tablet Take 2 tablets by mouth in the morning and 2 tablets before bedtime. Do all this for 30 days. 120 tablet 0    escitalopram (LEXAPRO) 10 MG tablet Take 1 tablet by mouth in the morning. 30 tablet 11    cetirizine (ZYRTEC) 10 MG tablet Take 10 mg by mouth daily           Review of Systems: 14 systems were negative except of what was stated on HPI    Nursing note and vitals reviewed. There were no vitals filed for this visit.   Wt Readings from Last 3 Encounters:   07/28/22 159 lb (72.1 kg)   11/03/21 152 lb (68.9 kg)   07/28/21 153 lb (69.4 kg)     BP Readings from Last 3 Encounters:   07/28/22 116/64   11/03/21 124/70   07/28/21 118/68     There is no height or weight on file to calculate BMI. Constitutional: Patient appears well-developed and well-nourished. No distress. Head: Normocephalic and atraumatic. Psychiatric: Normal mood and affect.  Behavior is normal.

## 2023-02-07 ENCOUNTER — TELEMEDICINE (OUTPATIENT)
Dept: INTERNAL MEDICINE CLINIC | Age: 33
End: 2023-02-07

## 2023-02-07 DIAGNOSIS — Z79.899 CONTROLLED SUBSTANCE AGREEMENT SIGNED: ICD-10-CM

## 2023-02-07 DIAGNOSIS — F98.8 ATTENTION DEFICIT DISORDER (ADD) WITHOUT HYPERACTIVITY: ICD-10-CM

## 2023-02-07 PROCEDURE — 99213 OFFICE O/P EST LOW 20 MIN: CPT | Performed by: INTERNAL MEDICINE

## 2023-02-07 RX ORDER — METHYLPHENIDATE HYDROCHLORIDE 10 MG/1
20 TABLET ORAL 2 TIMES DAILY
Qty: 120 TABLET | Refills: 0 | Status: SHIPPED | OUTPATIENT
Start: 2023-04-07 | End: 2023-05-07

## 2023-02-07 RX ORDER — METHYLPHENIDATE HYDROCHLORIDE 10 MG/1
20 TABLET ORAL 2 TIMES DAILY
Qty: 120 TABLET | Refills: 0 | Status: SHIPPED | OUTPATIENT
Start: 2023-03-07 | End: 2023-04-06

## 2023-02-07 RX ORDER — METHYLPHENIDATE HYDROCHLORIDE 10 MG/1
20 TABLET ORAL 2 TIMES DAILY
Qty: 120 TABLET | Refills: 0 | Status: SHIPPED | OUTPATIENT
Start: 2023-02-07 | End: 2023-03-09

## 2023-02-07 SDOH — ECONOMIC STABILITY: INCOME INSECURITY: HOW HARD IS IT FOR YOU TO PAY FOR THE VERY BASICS LIKE FOOD, HOUSING, MEDICAL CARE, AND HEATING?: NOT VERY HARD

## 2023-02-07 SDOH — ECONOMIC STABILITY: TRANSPORTATION INSECURITY
IN THE PAST 12 MONTHS, HAS LACK OF TRANSPORTATION KEPT YOU FROM MEETINGS, WORK, OR FROM GETTING THINGS NEEDED FOR DAILY LIVING?: NO

## 2023-02-07 SDOH — ECONOMIC STABILITY: FOOD INSECURITY: WITHIN THE PAST 12 MONTHS, THE FOOD YOU BOUGHT JUST DIDN'T LAST AND YOU DIDN'T HAVE MONEY TO GET MORE.: NEVER TRUE

## 2023-02-07 SDOH — ECONOMIC STABILITY: FOOD INSECURITY: WITHIN THE PAST 12 MONTHS, YOU WORRIED THAT YOUR FOOD WOULD RUN OUT BEFORE YOU GOT MONEY TO BUY MORE.: NEVER TRUE

## 2023-02-07 SDOH — ECONOMIC STABILITY: HOUSING INSECURITY
IN THE LAST 12 MONTHS, WAS THERE A TIME WHEN YOU DID NOT HAVE A STEADY PLACE TO SLEEP OR SLEPT IN A SHELTER (INCLUDING NOW)?: NO

## 2023-02-07 NOTE — PROGRESS NOTES
Pursuant to the emergency declaration under the [facility] waiver authority and the [employer] Act, this Virtual  Video Visit was conducted, with patient's consent, to reduce the patient's risk of exposure to COVID-19 and provide continuity of care. Service is  provided through a video synchronous discussion virtually to substitute for in-person clinic visit with the patient being at home and Dr. Gisell Recio being at home. Patient consent to the video visit. Date of Service:  2/7/2023    Chief Complaint:      Chief Complaint   Patient presents with    ADD       Assessment/Plan:    Annemarie Baker was seen today for add. Diagnoses and all orders for this visit:    Attention deficit disorder (ADD) without hyperactivity  -     methylphenidate (RITALIN) 10 MG tablet; Take 2 tablets by mouth 2 times daily for 30 days. -     methylphenidate (RITALIN) 10 MG tablet; Take 2 tablets by mouth 2 times daily for 30 days. -     methylphenidate (RITALIN) 10 MG tablet; Take 2 tablets by mouth 2 times daily for 30 days. Controlled substance agreement signed  -     methylphenidate (RITALIN) 10 MG tablet; Take 2 tablets by mouth 2 times daily for 30 days. -     methylphenidate (RITALIN) 10 MG tablet; Take 2 tablets by mouth 2 times daily for 30 days. -     methylphenidate (RITALIN) 10 MG tablet; Take 2 tablets by mouth 2 times daily for 30 days. Stable and continue on current medications. Return VV ADD 5/9. HPI:  Yuliya Solano is a 34 y.o. ADD:  improvement in concentrating and staying on task at work on Ritalin 10 mg 2 AM, 2 PM (1 at a time spaced out in the) 6-7 days a week. He used to be on Adderall 10 mg bid-tid 2014 and off once he graduated from [employer].   He did have GI upset with the med due to Adderall having lactose and he's lactose intolerant     Anxiety:  stable on Lexapro 10 mg daily and decrease libido side effect slowly improving over time. No more dreams with things going bad for the past year. He denies depression. He was on Prozac for mainly depression in the which didn't help and caused ED. No results found for: Artice Layman  Lab Results   Component Value Date     07/28/2022    K 4.6 07/28/2022     07/28/2022    CO2 28 07/28/2022    BUN 14 07/28/2022    CREATININE 0.8 (L) 07/28/2022    GLUCOSE 88 07/28/2022    CALCIUM 9.1 07/28/2022     Lab Results   Component Value Date/Time    CHOL 165 07/28/2022 08:05 AM    TRIG 73 07/28/2022 08:05 AM    HDL 66 07/28/2022 08:05 AM    LDLCALC 84 07/28/2022 08:05 AM     Lab Results   Component Value Date    ALT 18 07/28/2022    AST 23 07/28/2022     No results found for: TSH, T4FREE, T3FREE  Lab Results   Component Value Date    WBC 5.6 07/28/2022    HGB 14.5 07/28/2022    HCT 42.4 07/28/2022    MCV 93.7 07/28/2022     07/28/2022     No results found for: INR   No results found for: PSA   No results found for: [facility]     Patient Active Problem List   Diagnosis    Attention deficit disorder (ADD) without hyperactivity    Controlled substance agreement signed       No Known Allergies  Outpatient Medications Marked as Taking for the 2/7/23 encounter (Telemedicine) with Tim Turner MD   Medication Sig Dispense Refill    methylphenidate (RITALIN) 10 MG tablet Take 2 tablets by mouth 2 times daily for 30 days. 120 tablet 0    escitalopram (LEXAPRO) 10 MG tablet Take 1 tablet by mouth in the morning. 30 tablet 11    cetirizine (ZYRTEC) 10 MG tablet Take 10 mg by mouth daily           Review of Systems: 14 systems were negative except of what was stated on HPI    Nursing note and vitals reviewed. There were no vitals filed for this visit.   Wt Readings from Last 3 Encounters:   07/28/22 159 lb (72.1 kg)   11/03/21 152 lb (68.9 kg)   07/28/21 153 lb (69.4 kg)     BP Readings from Last 3 Encounters:   07/28/22 116/64   11/03/21 124/70   07/28/21 118/68 There is no height or weight on file to calculate BMI. Constitutional: Patient appears well-developed and well-nourished. No distress. Head: Normocephalic and atraumatic. Psychiatric: Normal mood and affect.  Behavior is normal.

## 2023-05-09 ENCOUNTER — TELEMEDICINE (OUTPATIENT)
Dept: INTERNAL MEDICINE CLINIC | Age: 33
End: 2023-05-09
Payer: COMMERCIAL

## 2023-05-09 DIAGNOSIS — F98.8 ATTENTION DEFICIT DISORDER (ADD) WITHOUT HYPERACTIVITY: ICD-10-CM

## 2023-05-09 DIAGNOSIS — Z79.899 CONTROLLED SUBSTANCE AGREEMENT SIGNED: ICD-10-CM

## 2023-05-09 PROCEDURE — 99213 OFFICE O/P EST LOW 20 MIN: CPT | Performed by: INTERNAL MEDICINE

## 2023-05-09 RX ORDER — METHYLPHENIDATE HYDROCHLORIDE 10 MG/1
20 TABLET ORAL 2 TIMES DAILY
Qty: 120 TABLET | Refills: 0 | Status: SHIPPED | OUTPATIENT
Start: 2023-07-09 | End: 2023-08-08

## 2023-05-09 RX ORDER — METHYLPHENIDATE HYDROCHLORIDE 10 MG/1
20 TABLET ORAL 2 TIMES DAILY
Qty: 120 TABLET | Refills: 0 | Status: SHIPPED | OUTPATIENT
Start: 2023-06-09 | End: 2023-07-09

## 2023-05-09 RX ORDER — METHYLPHENIDATE HYDROCHLORIDE 10 MG/1
20 TABLET ORAL 2 TIMES DAILY
Qty: 120 TABLET | Refills: 0 | Status: SHIPPED | OUTPATIENT
Start: 2023-05-09 | End: 2023-06-08

## 2023-05-09 ASSESSMENT — PATIENT HEALTH QUESTIONNAIRE - PHQ9
SUM OF ALL RESPONSES TO PHQ QUESTIONS 1-9: 0
SUM OF ALL RESPONSES TO PHQ9 QUESTIONS 1 & 2: 0
1. LITTLE INTEREST OR PLEASURE IN DOING THINGS: 0
2. FEELING DOWN, DEPRESSED OR HOPELESS: 0
SUM OF ALL RESPONSES TO PHQ QUESTIONS 1-9: 0

## 2023-05-09 NOTE — PROGRESS NOTES
Pursuant to the emergency declaration under the [facility], [facility] waiver authority and the [employer] and [employer] Act, this Virtual  Video Visit was conducted, with patient's consent, to reduce the patient's risk of exposure to COVID-19 and provide continuity of care. Service is  provided through a video synchronous discussion virtually to substitute for in-person clinic visit with the patient being at home and Dr. Finesse Be being at home. Patient consent to the video visit. Date of Service:  5/9/2023    Chief Complaint:      Chief Complaint   Patient presents with    ADD       Assessment/Plan:    Rosales Fregoso was seen today for add. Diagnoses and all orders for this visit:    Attention deficit disorder (ADD) without hyperactivity  -     methylphenidate (RITALIN) 10 MG tablet; Take 2 tablets by mouth 2 times daily for 30 days. -     methylphenidate (RITALIN) 10 MG tablet; Take 2 tablets by mouth 2 times daily for 30 days. -     methylphenidate (RITALIN) 10 MG tablet; Take 2 tablets by mouth 2 times daily for 30 days. Controlled substance agreement signed  -     methylphenidate (RITALIN) 10 MG tablet; Take 2 tablets by mouth 2 times daily for 30 days. -     methylphenidate (RITALIN) 10 MG tablet; Take 2 tablets by mouth 2 times daily for 30 days. -     methylphenidate (RITALIN) 10 MG tablet; Take 2 tablets by mouth 2 times daily for 30 days. Stable and continue on current medications. Return Fasting PE 8/9. HPI:  Seema Marinelli is a 34 y.o. ADD:  improvement in concentrating and staying on task at work on Ritalin 10 mg 2 AM, 2 PM (1 at a time spaced out in the) 6-7 days a week. He used to be on Adderall 10 mg bid-tid 2014 and [facility].   He did have GI upset with the med due to Adderall having lactose and he's lactose intolerant     Anxiety:  stable on Lexapro 10 mg daily and decrease

## 2023-06-21 ENCOUNTER — OFFICE VISIT (OUTPATIENT)
Dept: INTERNAL MEDICINE CLINIC | Age: 33
End: 2023-06-21
Payer: COMMERCIAL

## 2023-06-21 VITALS
WEIGHT: 151 LBS | DIASTOLIC BLOOD PRESSURE: 98 MMHG | OXYGEN SATURATION: 97 % | HEART RATE: 86 BPM | SYSTOLIC BLOOD PRESSURE: 140 MMHG | BODY MASS INDEX: 19.38 KG/M2 | HEIGHT: 74 IN

## 2023-06-21 DIAGNOSIS — Z00.00 ENCOUNTER FOR WELL ADULT EXAM WITHOUT ABNORMAL FINDINGS: Primary | ICD-10-CM

## 2023-06-21 DIAGNOSIS — F98.8 ATTENTION DEFICIT DISORDER (ADD) WITHOUT HYPERACTIVITY: ICD-10-CM

## 2023-06-21 DIAGNOSIS — Z79.899 CONTROLLED SUBSTANCE AGREEMENT SIGNED: ICD-10-CM

## 2023-06-21 DIAGNOSIS — F51.02 ADJUSTMENT INSOMNIA: ICD-10-CM

## 2023-06-21 DIAGNOSIS — F43.23 ADJUSTMENT REACTION WITH ANXIETY AND DEPRESSION: ICD-10-CM

## 2023-06-21 LAB
BASOPHILS # BLD: 0 K/UL (ref 0–0.2)
BASOPHILS NFR BLD: 0.6 %
DEPRECATED RDW RBC AUTO: 14.5 % (ref 12.4–15.4)
EOSINOPHIL # BLD: 0 K/UL (ref 0–0.6)
EOSINOPHIL NFR BLD: 0.9 %
HCT VFR BLD AUTO: 44 % (ref 40.5–52.5)
HGB BLD-MCNC: 14.9 G/DL (ref 13.5–17.5)
LYMPHOCYTES # BLD: 1.1 K/UL (ref 1–5.1)
LYMPHOCYTES NFR BLD: 22.2 %
MCH RBC QN AUTO: 31.8 PG (ref 26–34)
MCHC RBC AUTO-ENTMCNC: 34 G/DL (ref 31–36)
MCV RBC AUTO: 93.7 FL (ref 80–100)
MONOCYTES # BLD: 0.3 K/UL (ref 0–1.3)
MONOCYTES NFR BLD: 7.3 %
NEUTROPHILS # BLD: 3.3 K/UL (ref 1.7–7.7)
NEUTROPHILS NFR BLD: 69 %
PLATELET # BLD AUTO: 310 K/UL (ref 135–450)
PMV BLD AUTO: 7.8 FL (ref 5–10.5)
RBC # BLD AUTO: 4.69 M/UL (ref 4.2–5.9)
WBC # BLD AUTO: 4.8 K/UL (ref 4–11)

## 2023-06-21 PROCEDURE — 99395 PREV VISIT EST AGE 18-39: CPT | Performed by: INTERNAL MEDICINE

## 2023-06-21 PROCEDURE — 99213 OFFICE O/P EST LOW 20 MIN: CPT | Performed by: INTERNAL MEDICINE

## 2023-06-21 PROCEDURE — 36415 COLL VENOUS BLD VENIPUNCTURE: CPT | Performed by: INTERNAL MEDICINE

## 2023-06-21 RX ORDER — HYDROXYZINE PAMOATE 25 MG/1
25 CAPSULE ORAL 3 TIMES DAILY PRN
Qty: 90 CAPSULE | Refills: 0 | Status: SHIPPED | OUTPATIENT
Start: 2023-06-21 | End: 2023-07-21

## 2023-06-21 NOTE — PROGRESS NOTES
[facility]  History and Physical  Chago Cooper M.D. Rossy Delgado  YOB: 1990    Date of Service:  6/21/2023    Chief Complaint:   Rossy Delgado is a 28 y.o. male who presents for   Chief Complaint   Patient presents with    Depression    Anxiety    Annual Exam       Assessment/Plan:    Naresh Cassidy was seen today for depression, anxiety and annual exam.    Diagnoses and all orders for this visit:    Encounter for well adult exam without abnormal findings  -     Lipid Panel  -     Comprehensive Metabolic Panel  -     CBC with Auto Differential    Adjustment reaction with anxiety and depression  Start hydrOXYzine pamoate (VISTARIL) 25 MG capsule; Take 1 capsule by mouth 3 times daily as needed for Anxiety    Adjustment insomnia  Start hydrOXYzine pamoate (VISTARIL) 25 MG capsule; Take 1 capsule by mouth 3 times daily as needed for Anxiety  If not work, could try trazodone    Attention deficit disorder (ADD) without hyperactivity    Controlled substance agreement signed    Stable and continue on current treatment      Return VV Anxiety depression and ADD 7/25. HPI: Here for Annual Physical and Follow up. His wife just told him she's been cheating on him and now wants a divorce and she left. He has 2 kids at home 3 and 7 y/o at home. He has been getting panic attacks and having difficulty sleeping. Panic attacks only last a few minutes. He is seeing a counselor. Anxiety:  on Lexapro 10 mg daily and decrease libido side effect slowly improving over time. He can't tolerate higher dose. He was on Prozac for mainly depression in the which didn't help and caused ED. Failed zoloft in the past.    ADD:  improvement in concentrating and staying on task at work on Ritalin 10 mg 2 AM, 2 PM (1 at a time spaced out in the) 6-7 days a week. He used to be on Adderall 10 mg bid-tid 2014 and off once he graduated from [employer].   He did have GI upset with the med due to Adderall

## 2023-06-22 LAB
ALBUMIN SERPL-MCNC: 4.7 G/DL (ref 3.4–5)
ALBUMIN/GLOB SERPL: 1.7 {RATIO} (ref 1.1–2.2)
ALP SERPL-CCNC: 63 U/L (ref 40–129)
ALT SERPL-CCNC: 20 U/L (ref 10–40)
ANION GAP SERPL CALCULATED.3IONS-SCNC: 14 MMOL/L (ref 3–16)
AST SERPL-CCNC: 21 U/L (ref 15–37)
BILIRUB SERPL-MCNC: 0.5 MG/DL (ref 0–1)
BUN SERPL-MCNC: 14 MG/DL (ref 7–20)
CALCIUM SERPL-MCNC: 9.3 MG/DL (ref 8.3–10.6)
CHLORIDE SERPL-SCNC: 102 MMOL/L (ref 99–110)
CHOLEST SERPL-MCNC: 166 MG/DL (ref 0–199)
CO2 SERPL-SCNC: 24 MMOL/L (ref 21–32)
CREAT SERPL-MCNC: 0.8 MG/DL (ref 0.9–1.3)
GFR SERPLBLD CREATININE-BSD FMLA CKD-EPI: >60 ML/MIN/{1.73_M2}
GLUCOSE SERPL-MCNC: 103 MG/DL (ref 70–99)
HDLC SERPL-MCNC: 79 MG/DL (ref 40–60)
LDLC SERPL CALC-MCNC: 73 MG/DL
POTASSIUM SERPL-SCNC: 4.3 MMOL/L (ref 3.5–5.1)
PROT SERPL-MCNC: 7.4 G/DL (ref 6.4–8.2)
SODIUM SERPL-SCNC: 140 MMOL/L (ref 136–145)
TRIGL SERPL-MCNC: 69 MG/DL (ref 0–150)
VLDLC SERPL CALC-MCNC: 14 MG/DL

## 2023-07-22 DIAGNOSIS — F51.02 ADJUSTMENT INSOMNIA: ICD-10-CM

## 2023-07-24 RX ORDER — TRAZODONE HYDROCHLORIDE 50 MG/1
TABLET ORAL
Qty: 60 TABLET | Refills: 0 | OUTPATIENT
Start: 2023-07-24

## 2023-08-22 DIAGNOSIS — F43.22 ADJUSTMENT DISORDER WITH ANXIETY: ICD-10-CM

## 2023-08-22 RX ORDER — ESCITALOPRAM OXALATE 10 MG/1
TABLET ORAL
Qty: 30 TABLET | Refills: 11 | OUTPATIENT
Start: 2023-08-22